# Patient Record
Sex: MALE | Race: WHITE | NOT HISPANIC OR LATINO | URBAN - METROPOLITAN AREA
[De-identification: names, ages, dates, MRNs, and addresses within clinical notes are randomized per-mention and may not be internally consistent; named-entity substitution may affect disease eponyms.]

---

## 2018-12-11 ENCOUNTER — INPATIENT (INPATIENT)
Facility: HOSPITAL | Age: 83
LOS: 2 days | Discharge: HOME | End: 2018-12-14
Attending: FAMILY MEDICINE | Admitting: FAMILY MEDICINE
Payer: MEDICARE

## 2018-12-11 VITALS — WEIGHT: 145.06 LBS | HEIGHT: 63 IN

## 2018-12-11 LAB
ALBUMIN SERPL ELPH-MCNC: 3.5 G/DL — SIGNIFICANT CHANGE UP (ref 3.3–5)
ALP SERPL-CCNC: 89 U/L — SIGNIFICANT CHANGE UP (ref 40–120)
ALT FLD-CCNC: 29 U/L — SIGNIFICANT CHANGE UP (ref 12–78)
ANION GAP SERPL CALC-SCNC: 8 MMOL/L — SIGNIFICANT CHANGE UP (ref 5–17)
APPEARANCE UR: CLEAR — SIGNIFICANT CHANGE UP
AST SERPL-CCNC: 32 U/L — SIGNIFICANT CHANGE UP (ref 15–37)
BASOPHILS # BLD AUTO: 0.05 K/UL — SIGNIFICANT CHANGE UP (ref 0–0.2)
BASOPHILS NFR BLD AUTO: 0.5 % — SIGNIFICANT CHANGE UP (ref 0–2)
BILIRUB SERPL-MCNC: 0.6 MG/DL — SIGNIFICANT CHANGE UP (ref 0.2–1.2)
BILIRUB UR-MCNC: NEGATIVE — SIGNIFICANT CHANGE UP
BUN SERPL-MCNC: 28 MG/DL — HIGH (ref 7–23)
CALCIUM SERPL-MCNC: 9.1 MG/DL — SIGNIFICANT CHANGE UP (ref 8.5–10.1)
CHLORIDE SERPL-SCNC: 112 MMOL/L — HIGH (ref 96–108)
CO2 SERPL-SCNC: 24 MMOL/L — SIGNIFICANT CHANGE UP (ref 22–31)
COLOR SPEC: YELLOW — SIGNIFICANT CHANGE UP
CREAT SERPL-MCNC: 1.34 MG/DL — HIGH (ref 0.5–1.3)
DIFF PNL FLD: NEGATIVE — SIGNIFICANT CHANGE UP
EOSINOPHIL # BLD AUTO: 0.15 K/UL — SIGNIFICANT CHANGE UP (ref 0–0.5)
EOSINOPHIL NFR BLD AUTO: 1.4 % — SIGNIFICANT CHANGE UP (ref 0–6)
GLUCOSE SERPL-MCNC: 92 MG/DL — SIGNIFICANT CHANGE UP (ref 70–99)
GLUCOSE UR QL: NEGATIVE MG/DL — SIGNIFICANT CHANGE UP
HCT VFR BLD CALC: 44.9 % — SIGNIFICANT CHANGE UP (ref 39–50)
HGB BLD-MCNC: 14.3 G/DL — SIGNIFICANT CHANGE UP (ref 13–17)
IMM GRANULOCYTES NFR BLD AUTO: 0.6 % — SIGNIFICANT CHANGE UP (ref 0–1.5)
INR BLD: 1.1 RATIO — SIGNIFICANT CHANGE UP (ref 0.88–1.16)
KETONES UR-MCNC: NEGATIVE — SIGNIFICANT CHANGE UP
LEUKOCYTE ESTERASE UR-ACNC: NEGATIVE — SIGNIFICANT CHANGE UP
LYMPHOCYTES # BLD AUTO: 1.29 K/UL — SIGNIFICANT CHANGE UP (ref 1–3.3)
LYMPHOCYTES # BLD AUTO: 12.4 % — LOW (ref 13–44)
MCHC RBC-ENTMCNC: 30.3 PG — SIGNIFICANT CHANGE UP (ref 27–34)
MCHC RBC-ENTMCNC: 31.8 GM/DL — LOW (ref 32–36)
MCV RBC AUTO: 95.1 FL — SIGNIFICANT CHANGE UP (ref 80–100)
MONOCYTES # BLD AUTO: 0.96 K/UL — HIGH (ref 0–0.9)
MONOCYTES NFR BLD AUTO: 9.3 % — SIGNIFICANT CHANGE UP (ref 2–14)
NEUTROPHILS # BLD AUTO: 7.86 K/UL — HIGH (ref 1.8–7.4)
NEUTROPHILS NFR BLD AUTO: 75.8 % — SIGNIFICANT CHANGE UP (ref 43–77)
NITRITE UR-MCNC: NEGATIVE — SIGNIFICANT CHANGE UP
NRBC # BLD: 0 /100 WBCS — SIGNIFICANT CHANGE UP (ref 0–0)
NT-PROBNP SERPL-SCNC: 3539 PG/ML — HIGH (ref 0–450)
PH UR: 5 — SIGNIFICANT CHANGE UP (ref 5–8)
PLATELET # BLD AUTO: 289 K/UL — SIGNIFICANT CHANGE UP (ref 150–400)
POTASSIUM SERPL-MCNC: 4.8 MMOL/L — SIGNIFICANT CHANGE UP (ref 3.5–5.3)
POTASSIUM SERPL-SCNC: 4.8 MMOL/L — SIGNIFICANT CHANGE UP (ref 3.5–5.3)
PROT SERPL-MCNC: 7.3 GM/DL — SIGNIFICANT CHANGE UP (ref 6–8.3)
PROT UR-MCNC: 15 MG/DL
PROTHROM AB SERPL-ACNC: 12.3 SEC — SIGNIFICANT CHANGE UP (ref 10–12.9)
RBC # BLD: 4.72 M/UL — SIGNIFICANT CHANGE UP (ref 4.2–5.8)
RBC # FLD: 14.3 % — SIGNIFICANT CHANGE UP (ref 10.3–14.5)
RBC CASTS # UR COMP ASSIST: NEGATIVE /HPF — SIGNIFICANT CHANGE UP (ref 0–4)
SODIUM SERPL-SCNC: 144 MMOL/L — SIGNIFICANT CHANGE UP (ref 135–145)
SP GR SPEC: 1.01 — SIGNIFICANT CHANGE UP (ref 1.01–1.02)
TROPONIN I SERPL-MCNC: 0.09 NG/ML — HIGH (ref 0.01–0.04)
TROPONIN I SERPL-MCNC: 0.1 NG/ML — HIGH (ref 0.01–0.04)
UROBILINOGEN FLD QL: NEGATIVE MG/DL — SIGNIFICANT CHANGE UP
WBC # BLD: 10.37 K/UL — SIGNIFICANT CHANGE UP (ref 3.8–10.5)
WBC # FLD AUTO: 10.37 K/UL — SIGNIFICANT CHANGE UP (ref 3.8–10.5)
WBC UR QL: SIGNIFICANT CHANGE UP

## 2018-12-11 PROCEDURE — 99285 EMERGENCY DEPT VISIT HI MDM: CPT

## 2018-12-11 PROCEDURE — 71250 CT THORAX DX C-: CPT | Mod: 26

## 2018-12-11 PROCEDURE — 93010 ELECTROCARDIOGRAM REPORT: CPT

## 2018-12-11 RX ORDER — PANTOPRAZOLE SODIUM 20 MG/1
40 TABLET, DELAYED RELEASE ORAL
Qty: 0 | Refills: 0 | Status: DISCONTINUED | OUTPATIENT
Start: 2018-12-11 | End: 2018-12-14

## 2018-12-11 RX ORDER — AZITHROMYCIN 500 MG/1
500 TABLET, FILM COATED ORAL ONCE
Qty: 0 | Refills: 0 | Status: COMPLETED | OUTPATIENT
Start: 2018-12-11 | End: 2018-12-11

## 2018-12-11 RX ORDER — ATORVASTATIN CALCIUM 80 MG/1
10 TABLET, FILM COATED ORAL AT BEDTIME
Qty: 0 | Refills: 0 | Status: DISCONTINUED | OUTPATIENT
Start: 2018-12-11 | End: 2018-12-14

## 2018-12-11 RX ORDER — ENOXAPARIN SODIUM 100 MG/ML
30 INJECTION SUBCUTANEOUS EVERY 24 HOURS
Qty: 0 | Refills: 0 | Status: DISCONTINUED | OUTPATIENT
Start: 2018-12-11 | End: 2018-12-12

## 2018-12-11 RX ORDER — FUROSEMIDE 40 MG
40 TABLET ORAL DAILY
Qty: 0 | Refills: 0 | Status: DISCONTINUED | OUTPATIENT
Start: 2018-12-11 | End: 2018-12-13

## 2018-12-11 RX ORDER — ASPIRIN/CALCIUM CARB/MAGNESIUM 324 MG
325 TABLET ORAL ONCE
Qty: 0 | Refills: 0 | Status: COMPLETED | OUTPATIENT
Start: 2018-12-11 | End: 2018-12-11

## 2018-12-11 RX ORDER — CEFTRIAXONE 500 MG/1
1000 INJECTION, POWDER, FOR SOLUTION INTRAMUSCULAR; INTRAVENOUS EVERY 24 HOURS
Qty: 0 | Refills: 0 | Status: DISCONTINUED | OUTPATIENT
Start: 2018-12-11 | End: 2018-12-14

## 2018-12-11 RX ORDER — ASPIRIN/CALCIUM CARB/MAGNESIUM 324 MG
81 TABLET ORAL DAILY
Qty: 0 | Refills: 0 | Status: DISCONTINUED | OUTPATIENT
Start: 2018-12-11 | End: 2018-12-14

## 2018-12-11 RX ORDER — AZITHROMYCIN 500 MG/1
500 TABLET, FILM COATED ORAL EVERY 24 HOURS
Qty: 0 | Refills: 0 | Status: DISCONTINUED | OUTPATIENT
Start: 2018-12-12 | End: 2018-12-14

## 2018-12-11 RX ORDER — CEFTRIAXONE 500 MG/1
1000 INJECTION, POWDER, FOR SOLUTION INTRAMUSCULAR; INTRAVENOUS ONCE
Qty: 0 | Refills: 0 | Status: COMPLETED | OUTPATIENT
Start: 2018-12-11 | End: 2018-12-11

## 2018-12-11 RX ORDER — MONTELUKAST 4 MG/1
10 TABLET, CHEWABLE ORAL DAILY
Qty: 0 | Refills: 0 | Status: DISCONTINUED | OUTPATIENT
Start: 2018-12-11 | End: 2018-12-14

## 2018-12-11 RX ORDER — FUROSEMIDE 40 MG
40 TABLET ORAL ONCE
Qty: 0 | Refills: 0 | Status: COMPLETED | OUTPATIENT
Start: 2018-12-11 | End: 2018-12-11

## 2018-12-11 RX ADMIN — Medication 325 MILLIGRAM(S): at 13:59

## 2018-12-11 RX ADMIN — CEFTRIAXONE 1000 MILLIGRAM(S): 500 INJECTION, POWDER, FOR SOLUTION INTRAMUSCULAR; INTRAVENOUS at 14:39

## 2018-12-11 RX ADMIN — MONTELUKAST 10 MILLIGRAM(S): 4 TABLET, CHEWABLE ORAL at 22:15

## 2018-12-11 RX ADMIN — ATORVASTATIN CALCIUM 10 MILLIGRAM(S): 80 TABLET, FILM COATED ORAL at 22:15

## 2018-12-11 RX ADMIN — AZITHROMYCIN 500 MILLIGRAM(S): 500 TABLET, FILM COATED ORAL at 16:46

## 2018-12-11 RX ADMIN — Medication 40 MILLIGRAM(S): at 13:59

## 2018-12-11 RX ADMIN — AZITHROMYCIN 255 MILLIGRAM(S): 500 TABLET, FILM COATED ORAL at 14:39

## 2018-12-11 RX ADMIN — ENOXAPARIN SODIUM 30 MILLIGRAM(S): 100 INJECTION SUBCUTANEOUS at 22:31

## 2018-12-11 NOTE — ED PROVIDER NOTE - OBJECTIVE STATEMENT
89 y/o male with a PMHx of [place] and chronic right hip pain on pain medication presents to the ED c/o cough x2 months. As per wife at bedside pt saw Dr. Posadas 3 weeks ago for an itchy throat, cough and sneezing, was given allergy medication. Recently, cough became productive with green phlegm and some wheezing. Pt went to  in [place] yesterday and CXR showed fluid in his left lung. Pt is now in the ED for more testing. Nonsmoker. No EtOH consumption. 89 y/o male with a PMHx of SVT s/p ablation at [facility], [place] and chronic right hip pain on pain medication presents to the ED c/o cough x2 months. As per wife at bedside pt saw Dr. Posadas 3 weeks ago for an itchy throat, cough and sneezing, was given allergy medication. Recently, cough became productive with green phlegm and some wheezing. Pt went to  in [place] yesterday and CXR showed fluid in his left lung. Pt is now in the ED for more testing. Nonsmoker. No EtOH consumption.

## 2018-12-11 NOTE — ED ADULT TRIAGE NOTE - CHIEF COMPLAINT QUOTE
Patient presents with daughter who reports patient has been wheezing. [facility] Patient denies shortness of breath

## 2018-12-11 NOTE — PHARMACOTHERAPY INTERVENTION NOTE - COMMENTS
med history complete, reviewed medications with patient and family. Patient currently has no questions about their medications.

## 2018-12-11 NOTE — ED ADULT NURSE NOTE - NSIMPLEMENTINTERV_GEN_ALL_ED
Implemented All Fall with Harm Risk Interventions:  [place] to call system. Call bell, personal items and telephone within reach. Instruct patient to call for assistance. Room bathroom lighting operational. Non-slip footwear when patient is off stretcher. Physically safe environment: no spills, clutter or unnecessary equipment. Stretcher in lowest position, wheels locked, appropriate side rails in place. Provide visual cue, wrist band, yellow gown, etc. Monitor gait and stability. Monitor for mental status changes and reorient to person, place, and time. Review medications for side effects contributing to fall risk. Reinforce activity limits and safety measures with patient and family. Provide visual clues: red socks.

## 2018-12-11 NOTE — H&P ADULT - ASSESSMENT
89 y/o male with a PMHx of SVT s/p ablation 5 yrs ago at [place], hx right hip replacement  and revision, and chronic right hip pain on pain medication, hard of hearing presented to the ED c/o cough x2 months. Pt saw Dr. Posadas 3 weeks ago for an itchy throat, cough and sneezing, was given allergy medication. Recently, cough became productive with green phlegm and some wheezing associated  with B/l lower extremity edema. Pt went to  in [place] today and CXR showed fluid in his left lung and was sent to the ED for further evaluation.  ROS- patient  denies any chest pain or SOB ,denies abdominal pain or fever or chills, no dysuria   In ED patient afebrile , found to have  B/L pleural effusions L>R , cannot exclude LLL and lingular PNA  On labs No leukocytosis,  BALDO BUN 28/cr 1.34, BNP 3500, troponin elevated upto 0.1  EKG sinus rhthym with PVC 's old septal infarct unchanged from EKG done Oct 2018    A/P  #Acute Heart failure / EF unknown /no Prior hx of CHF  - Admit to tele  - continue IV lasix   -Monitor I/O, Daily weights  -Echo  -serial cardiac enzymes  -Lipid and TSH in am  -Cardiology consult    #Suspect Left lower lobe and lingular community acquired Pneumonia/ suspect GNR and atypical   - IV ceftriaxone and IV zithromax  -Blood cx  -urine legionella and mycoplasma antibodies    #BALDO likely prerenal azotemia from acute heart failure  /No  prior labs to compare with  - continue IV lasix for now  and monitor BMP    #Elevated troponins  with no significant new acute EKG changes ,no chest pain - Likely demand ischemia  -monitor cardiac enzymes and continue tele monitoring     #DVT prophylaxis - Lovenox SC  IMPROVE VTE Individual Risk Assessment    RISK                                                                Points    [  ] Previous VTE                                                  3    [  ] Thrombophilia                                               2    [  ] Lower limb paralysis                                      2        (unable to hold up >15 seconds)      [  ] Current Cancer                                              2         (within 6 months)    [  ] Immobilization > 24 hrs                                1    [  ] ICU/CCU stay > 24 hours                              1    [  ] Age > 60                                                      1    IMPROVE VTE Score _____2____ 89 y/o male with a PMHx of SVT s/p ablation 5 yrs ago at [place], hx right hip replacement  and revision, and chronic right hip pain on pain medication, hard of hearing presented to the ED c/o cough x2 months. Pt saw Dr. Posadas 3 weeks ago for an itchy throat, cough and sneezing, was given allergy medication. Recently, cough became productive with green phlegm and some wheezing associated  with B/l lower extremity edema. Pt went to  in [place] today and CXR showed fluid in his left lung and was sent to the ED for further evaluation.  ROS- patient  denies any chest pain or SOB ,denies abdominal pain or fever or chills, no dysuria   In ED patient afebrile , found to have  B/L pleural effusions L>R , cannot exclude LLL and lingular PNA  On labs No leukocytosis,  BALDO BUN 28/cr 1.34, BNP 3500, troponin elevated upto 0.1  EKG sinus rhthym with PVC 's old septal infarct unchanged from EKG done Oct 2018    A/P  #Acute Heart failure / EF unknown /no Prior hx of CHF  - Admit to tele  - continue IV lasix   continue asa, statin  -will hold off ACEI for now due to BALDO  -Monitor I/O, Daily weights  -Echo  -serial cardiac enzymes  -Lipid and TSH in am  -Cardiology consult    #Suspect Left lower lobe and lingular community acquired Pneumonia/ suspect GNR and atypical   - IV ceftriaxone and IV zithromax  -Blood cx  -urine legionella and mycoplasma antibodies    #BALDO likely prerenal azotemia from acute heart failure  /No  prior labs to compare with  - continue IV lasix for now  and monitor BMP    #Elevated troponins  with no significant new acute EKG changes ,no chest pain - Likely demand ischemia  -monitor cardiac enzymes and continue tele monitoring     #DVT prophylaxis - Lovenox SC  IMPROVE VTE Individual Risk Assessment    RISK                                                                Points    [  ] Previous VTE                                                  3    [  ] Thrombophilia                                               2    [  ] Lower limb paralysis                                      2        (unable to hold up >15 seconds)      [  ] Current Cancer                                              2         (within 6 months)    [  ] Immobilization > 24 hrs                                1    [  ] ICU/CCU stay > 24 hours                              1    [  ] Age > 60                                                      1    IMPROVE VTE Score _____2____

## 2018-12-11 NOTE — ED ADULT NURSE NOTE - CHIEF COMPLAINT QUOTE
Patient presents with daughter who reports patient has been wheezing. [facility] Patient denies shortness of breath

## 2018-12-11 NOTE — ED PROVIDER NOTE - CARE PLAN
Principal Discharge DX:	CHF (congestive heart failure)  Secondary Diagnosis:	Opacity of lung on imaging study

## 2018-12-11 NOTE — H&P ADULT - NSHPLABSRESULTS_GEN_ALL_CORE
14.3   10.37 )-----------( 289      ( 11 Dec 2018 11:08 )             44.9       CBC Full  -  ( 11 Dec 2018 11:08 )  WBC Count : 10.37 K/uL  Hemoglobin : 14.3 g/dL  Hematocrit : 44.9 %  Platelet Count - Automated : 289 K/uL  Mean Cell Volume : 95.1 fl  Mean Cell Hemoglobin : 30.3 pg  Mean Cell Hemoglobin Concentration : 31.8 gm/dL  Auto Neutrophil # : 7.86 K/uL  Auto Lymphocyte # : 1.29 K/uL  Auto Monocyte # : 0.96 K/uL  Auto Eosinophil # : 0.15 K/uL  Auto Basophil # : 0.05 K/uL  Auto Neutrophil % : 75.8 %  Auto Lymphocyte % : 12.4 %  Auto Monocyte % : 9.3 %  Auto Eosinophil % : 1.4 %  Auto Basophil % : 0.5 %          144  |  112<H>  |  28<H>  ----------------------------<  92  4.8   |  24  |  1.34<H>    Ca    9.1      11 Dec 2018 11:08    TPro  7.3  /  Alb  3.5  /  TBili  0.6  /  DBili  x   /  AST  32  /  ALT  29  /  AlkPhos  89  12-11      LIVER FUNCTIONS - ( 11 Dec 2018 11:08 )  Alb: 3.5 g/dL / Pro: 7.3 gm/dL / ALK PHOS: 89 U/L / ALT: 29 U/L / AST: 32 U/L / GGT: x             PT/INR - ( 11 Dec 2018 11:08 )   PT: 12.3 sec;   INR: 1.10 ratio             CARDIAC MARKERS ( 11 Dec 2018 14:09 )  0.100 ng/mL / x     / x     / x     / x      CARDIAC MARKERS ( 11 Dec 2018 11:08 )  0.094 ng/mL / x     / x     / x     / x            Urinalysis Basic - ( 11 Dec 2018 14:46 )    Color: Yellow / Appearance: Clear / S.015 / pH: x  Gluc: x / Ketone: Negative  / Bili: Negative / Urobili: Negative mg/dL   Blood: x / Protein: 15 mg/dL / Nitrite: Negative   Leuk Esterase: Negative / RBC: Negative /HPF / WBC 0-2   Sq Epi: x / Non Sq Epi: x / Bacteria: x            MEDICATIONS  (STANDING):  aspirin enteric coated 81 milliGRAM(s) Oral daily  atorvastatin 10 milliGRAM(s) Oral at bedtime  cefTRIAXone Injectable. 1000 milliGRAM(s) IV Push every 24 hours  enoxaparin Injectable 30 milliGRAM(s) SubCutaneous every 24 hours  furosemide   Injectable 40 milliGRAM(s) IV Push daily  montelukast 10 milliGRAM(s) Oral daily  pantoprazole    Tablet 40 milliGRAM(s) Oral before breakfast

## 2018-12-11 NOTE — H&P ADULT - NSHPPHYSICALEXAM_GEN_ALL_CORE
PHYSICAL EXAM:    Daily Height in cm: 160.02 (11 Dec 2018 10:52)    Daily     ICU Vital Signs Last 24 Hrs  T(C): 36.7 (11 Dec 2018 12:02), Max: 36.7 (11 Dec 2018 12:02)  T(F): 98 (11 Dec 2018 12:02), Max: 98 (11 Dec 2018 12:02)  HR: 86 (11 Dec 2018 14:26) (82 - 101)  BP: 135/84 (11 Dec 2018 14:26) (127/92 - 155/97)  BP(mean): --  ABP: --  ABP(mean): --  RR: 18 (11 Dec 2018 14:26) (18 - 18)  SpO2: 100% (11 Dec 2018 14:26) (97% - 100%)      Constitutional: NAD  HEENT: Atraumatic, [place]IR[place], Normal, No congestion  Respiratory: decreased breath sound bilateral lung bases  Cardiovascular: N S1S2;  Gastrointestinal: Abdomen soft, non tender, Bowel Ssounds present  Extremities: 2+ B/l Lower extremity pitting edema  Neurological: AAOriented to place, person, follows commands ,no gross focal motor deficits  Back: No CVA tenderness   Musculoskeletal: non tender  Breasts: Deferred  Genitourinary: deferred  Rectal: Deferred

## 2018-12-11 NOTE — ED ADULT NURSE REASSESSMENT NOTE - NS ED NURSE REASSESS COMMENT FT1
Pt and family updated on POC and verbalize awareness. Pt denies distress at this time. Pt awaiting bed availability. Tolerated lunch well. Will cont to monitor.

## 2018-12-11 NOTE — ED PROVIDER NOTE - MUSCULOSKELETAL, MLM
Spine appears normal, range of motion is not limited, no muscle or joint tenderness. 2+ pitting edema bilateral lower extremities.

## 2018-12-11 NOTE — ED PROVIDER NOTE - MEDICAL DECISION MAKING DETAILS
Pt with signs of CHF exacerbation based on labs and imaging.  Mild troponin elevation likely 2/2 demand ischemia from CHF exacerbation.  Given ASA, and Lasix IV.  Also LLL and lingular PNA unable to be excluded on imaging, and pt received IV antibiotics for CAP coverage.  Discussed admission to medicine service with Dr. Martins. Pt with signs of CHF exacerbation based on labs and imaging.  Mild troponin elevation likely 2/2 demand ischemia from CHF exacerbation.  Given ASA, and Lasix IV.  Also LLL and lingular PNA unable to be excluded on imaging, and pt received IV antibiotics for CAP coverage.  IV fluids held given pt with CHF exacerbation, and currently doesn't meet sepsis criteria. Discussed admission to medicine service with Dr. Martins.

## 2018-12-11 NOTE — ED ADULT NURSE NOTE - OBJECTIVE STATEMENT
Pt and family reports cough being followed outpt that has lasted for months. Pt reports decreased activity intolerance and wheezing. Pt denies SOB at rest. Denies pain at this time.

## 2018-12-11 NOTE — H&P ADULT - HISTORY OF PRESENT ILLNESS
89 y/o male with a PMHx of SVT s/p ablation 5 yrs ago at [place], hx right hip replacement  and revision, and chronic right hip pain on pain medication presented to the ED c/o cough x2 months. Pt saw Dr. Posadas 3 weeks ago for an itchy throat, cough and sneezing, was given allergy medication. Recently, cough became productive with green phlegm and some wheezing associated  with B/l lower extremity edema. Pt went to  in [place] today and CXR showed fluid in his left lung and was sent to the ED for further evaluation.  ROS- patient  denies any chest pain or SOB ,denies abdominal pain or fever or chills, no dysuria   In ED patient afebrile , found to have  B/L pleural effusions L>R , cannot exclude LLL and lingular PNA  On labs 89 y/o male with a PMHx of SVT s/p ablation 5 yrs ago at [place], hx right hip replacement  and revision, and chronic right hip pain on pain medication, hard of hearing presented to the ED c/o cough x2 months. Pt saw Dr. Posadas 3 weeks ago for an itchy throat, cough and sneezing, was given allergy medication. Recently, cough became productive with green phlegm and some wheezing associated  with B/l lower extremity edema. Pt went to  in [place] today and CXR showed fluid in his left lung and was sent to the ED for further evaluation.  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

## 2018-12-12 DIAGNOSIS — R74.8 ABNORMAL LEVELS OF OTHER SERUM ENZYMES: ICD-10-CM

## 2018-12-12 DIAGNOSIS — J90 PLEURAL EFFUSION, NOT ELSEWHERE CLASSIFIED: ICD-10-CM

## 2018-12-12 DIAGNOSIS — J18.9 PNEUMONIA, UNSPECIFIED ORGANISM: ICD-10-CM

## 2018-12-12 DIAGNOSIS — I50.9 HEART FAILURE, UNSPECIFIED: ICD-10-CM

## 2018-12-12 LAB
ANION GAP SERPL CALC-SCNC: 8 MMOL/L — SIGNIFICANT CHANGE UP (ref 5–17)
BASOPHILS # BLD AUTO: 0.04 K/UL — SIGNIFICANT CHANGE UP (ref 0–0.2)
BASOPHILS NFR BLD AUTO: 0.4 % — SIGNIFICANT CHANGE UP (ref 0–2)
BUN SERPL-MCNC: 29 MG/DL — HIGH (ref 7–23)
CALCIUM SERPL-MCNC: 8.9 MG/DL — SIGNIFICANT CHANGE UP (ref 8.5–10.1)
CHLORIDE SERPL-SCNC: 107 MMOL/L — SIGNIFICANT CHANGE UP (ref 96–108)
CHOLEST SERPL-MCNC: 129 MG/DL — SIGNIFICANT CHANGE UP (ref 10–199)
CO2 SERPL-SCNC: 26 MMOL/L — SIGNIFICANT CHANGE UP (ref 22–31)
CREAT SERPL-MCNC: 1.37 MG/DL — HIGH (ref 0.5–1.3)
EOSINOPHIL # BLD AUTO: 0.19 K/UL — SIGNIFICANT CHANGE UP (ref 0–0.5)
EOSINOPHIL NFR BLD AUTO: 2 % — SIGNIFICANT CHANGE UP (ref 0–6)
GLUCOSE SERPL-MCNC: 88 MG/DL — SIGNIFICANT CHANGE UP (ref 70–99)
HCT VFR BLD CALC: 42.6 % — SIGNIFICANT CHANGE UP (ref 39–50)
HDLC SERPL-MCNC: 44 MG/DL — SIGNIFICANT CHANGE UP
HGB BLD-MCNC: 13.9 G/DL — SIGNIFICANT CHANGE UP (ref 13–17)
IMM GRANULOCYTES NFR BLD AUTO: 0.4 % — SIGNIFICANT CHANGE UP (ref 0–1.5)
LIPID PNL WITH DIRECT LDL SERPL: 75 MG/DL — SIGNIFICANT CHANGE UP
LYMPHOCYTES # BLD AUTO: 1.04 K/UL — SIGNIFICANT CHANGE UP (ref 1–3.3)
LYMPHOCYTES # BLD AUTO: 10.8 % — LOW (ref 13–44)
MCHC RBC-ENTMCNC: 31 PG — SIGNIFICANT CHANGE UP (ref 27–34)
MCHC RBC-ENTMCNC: 32.6 GM/DL — SIGNIFICANT CHANGE UP (ref 32–36)
MCV RBC AUTO: 94.9 FL — SIGNIFICANT CHANGE UP (ref 80–100)
MONOCYTES # BLD AUTO: 0.74 K/UL — SIGNIFICANT CHANGE UP (ref 0–0.9)
MONOCYTES NFR BLD AUTO: 7.7 % — SIGNIFICANT CHANGE UP (ref 2–14)
NEUTROPHILS # BLD AUTO: 7.58 K/UL — HIGH (ref 1.8–7.4)
NEUTROPHILS NFR BLD AUTO: 78.7 % — HIGH (ref 43–77)
NRBC # BLD: 0 /100 WBCS — SIGNIFICANT CHANGE UP (ref 0–0)
PLATELET # BLD AUTO: 268 K/UL — SIGNIFICANT CHANGE UP (ref 150–400)
POTASSIUM SERPL-MCNC: 4.9 MMOL/L — SIGNIFICANT CHANGE UP (ref 3.5–5.3)
POTASSIUM SERPL-SCNC: 4.9 MMOL/L — SIGNIFICANT CHANGE UP (ref 3.5–5.3)
RBC # BLD: 4.49 M/UL — SIGNIFICANT CHANGE UP (ref 4.2–5.8)
RBC # FLD: 14.3 % — SIGNIFICANT CHANGE UP (ref 10.3–14.5)
SODIUM SERPL-SCNC: 141 MMOL/L — SIGNIFICANT CHANGE UP (ref 135–145)
TOTAL CHOLESTEROL/HDL RATIO MEASUREMENT: 2.9 RATIO — LOW (ref 3.4–9.6)
TRIGL SERPL-MCNC: 52 MG/DL — SIGNIFICANT CHANGE UP (ref 10–149)
TSH SERPL-MCNC: 2 UU/ML — SIGNIFICANT CHANGE UP (ref 0.34–4.82)
WBC # BLD: 9.63 K/UL — SIGNIFICANT CHANGE UP (ref 3.8–10.5)
WBC # FLD AUTO: 9.63 K/UL — SIGNIFICANT CHANGE UP (ref 3.8–10.5)

## 2018-12-12 PROCEDURE — 93010 ELECTROCARDIOGRAM REPORT: CPT

## 2018-12-12 PROCEDURE — 93306 TTE W/DOPPLER COMPLETE: CPT | Mod: 26

## 2018-12-12 PROCEDURE — 99223 1ST HOSP IP/OBS HIGH 75: CPT

## 2018-12-12 RX ORDER — HEPARIN SODIUM 5000 [USP'U]/ML
5000 INJECTION INTRAVENOUS; SUBCUTANEOUS EVERY 12 HOURS
Qty: 0 | Refills: 0 | Status: DISCONTINUED | OUTPATIENT
Start: 2018-12-12 | End: 2018-12-14

## 2018-12-12 RX ADMIN — Medication 81 MILLIGRAM(S): at 11:38

## 2018-12-12 RX ADMIN — AZITHROMYCIN 255 MILLIGRAM(S): 500 TABLET, FILM COATED ORAL at 14:11

## 2018-12-12 RX ADMIN — Medication 600 MILLIGRAM(S): at 18:04

## 2018-12-12 RX ADMIN — Medication 40 MILLIGRAM(S): at 11:38

## 2018-12-12 RX ADMIN — PANTOPRAZOLE SODIUM 40 MILLIGRAM(S): 20 TABLET, DELAYED RELEASE ORAL at 06:39

## 2018-12-12 RX ADMIN — MONTELUKAST 10 MILLIGRAM(S): 4 TABLET, CHEWABLE ORAL at 11:38

## 2018-12-12 RX ADMIN — CEFTRIAXONE 1000 MILLIGRAM(S): 500 INJECTION, POWDER, FOR SOLUTION INTRAMUSCULAR; INTRAVENOUS at 14:11

## 2018-12-12 RX ADMIN — HEPARIN SODIUM 5000 UNIT(S): 5000 INJECTION INTRAVENOUS; SUBCUTANEOUS at 22:12

## 2018-12-12 RX ADMIN — ATORVASTATIN CALCIUM 10 MILLIGRAM(S): 80 TABLET, FILM COATED ORAL at 22:11

## 2018-12-12 NOTE — CONSULT NOTE ADULT - ASSESSMENT
SOB and cough - acute on chronic decompensated HF- unknown LVEF- hypervolemic, NYHA class III-   Continue diuresis with iv lasix.  Diuresis with close monitoring of the renal function and electrolytes.  Goal potassium of 4 and magnesium of 2.   Strict I/O and daily wt checks. Low sodium diet. Nutrition education.   check 2 D echo.  Pt does not know if he had ischemic workup in past.  Had SVT ablation in past per family.  Monitor for arrythmias.  Check TFTs.    Hyperlipidemia- c ontinue statin.    Discussed clinical mange ment plan with daughter at length.     Other medical issues- Management per primary team.   Thank you for allowing me to participate in the care of this patient. Please feel free to contact me with any questions.

## 2018-12-12 NOTE — CONSULT NOTE ADULT - SUBJECTIVE AND OBJECTIVE BOX
THIS IS THE NOTE FOR 2018, PT SEEN IN THE ER AT 5.28PM, IN ROOM I.    Patient is a 89y old  Male who presents with a chief complaint of cough, wheezing.   HPI:  89 y/o male with a PMHx of SVT s/p ablation 5 yrs ago at [place], hx right hip replacement  and revision, and chronic right hip pain on pain medication, hard of hearing presented to the ED c/o cough x2 months. Pt saw Dr. Posadas 3 weeks ago for an itchy throat, cough and sneezing, was given allergy medication. Recently, cough became productive with green phlegm and some wheezing associated  with B/l lower extremity edema. Pt went to  in [place] today and CXR showed fluid in his left lung and was sent to the ED for further evaluation.    In ED patient afebrile , found to have  B/L pleural effusions L>R , cannot exclude LLL and lingular PNA  On labs No leukocytosis,  BALDO BUN 28/cr 1.34, BNP 3500, troponin elevated upto 0.1    Cardiology team consulted for evaluation for CHF.    Pt states that he had been diuresing since presentation since diuretic was given to him.  He denies any CP or pressure or dizziness.  His daughter at bedside and she agreed with the history provided.         Pshx- Left ankle surgery, R hip replacemnt and revision , tonsillectomy, radiofrequency cardio ablation for SVT 5 yrs ago at [facility]  social hx- denies etoh use, never smoker, denies recreational drug use  family hx- hx lung cancer brother, hx of colon cancer 2nd brother         MEDICATIONS  (STANDING):  aspirin enteric coated 81 milliGRAM(s) Oral daily  atorvastatin 10 milliGRAM(s) Oral at bedtime  azithromycin  IVPB 500 milliGRAM(s) IV Intermittent every 24 hours  cefTRIAXone Injectable. 1000 milliGRAM(s) IV Push every 24 hours  enoxaparin Injectable 30 milliGRAM(s) SubCutaneous every 24 hours  furosemide   Injectable 40 milliGRAM(s) IV Push daily  montelukast 10 milliGRAM(s) Oral daily  pantoprazole    Tablet 40 milliGRAM(s) Oral before breakfast        REVIEW OF SYSTEMS:  CONSTITUTIONAL:    No fatigue, malaise, lethargy.  No fever or chills.  HEENT:  Eyes:  No visual changes.     ENT:  No epistaxis.  No sinus pain.    RESPIRATORY:  c/o cough.  No wheeze.  No hemoptysis.  c/o shortness of breath.  CARDIOVASCULAR:  No chest pains.  No palpitations. No shortness of breath, No orthopnea or PND.  GASTROINTESTINAL:  No abdominal pain.  No nausea or vomiting.    GENITOURINARY:    No hematuria.    MUSCULOSKELETAL:  No musculoskeletal pain.  No joint swelling.  No arthritis.  NEUROLOGICAL:  No tingling or numbness or weakness.  PSYCHIATRIC:  No confusion  SKIN:  No rashes.    ENDOCRINE:  No unexplained weight loss.  No polydipsia.   HEMATOLOGIC:  No anemia.  No prolonged or excessive bleeding.   ALLERGIC AND IMMUNOLOGIC:  No pruritus.          Vital Signs Last 24 Hrs  T(C): 36.6 (12 Dec 2018 04:45), Max: 36.7 (11 Dec 2018 12:02)  T(F): 97.8 (12 Dec 2018 04:45), Max: 98 (11 Dec 2018 12:02)  HR: 89 (12 Dec 2018 04:45) (82 - 101)  BP: 136/82 (12 Dec 2018 04:45) (109/81 - 155/97)  BP(mean): --  RR: 18 (12 Dec 2018 04:45) (18 - 21)  SpO2: 95% (12 Dec 2018 04:45) (95% - 100%)    PHYSICAL EXAM-    Constitutional: The patient appears to be normal, well developed, well nourished and alert and oriented to time, place and person. The patient does not appear acutely ill.     Head: Head is normocephalic and atraumatic.      Neck: No jugular venous distention. No audible carotid bruits. There are strong carotid pulses bilaterally. No JVD.     Cardiovascular: Regular rate and rhythm without S3, S4. No murmurs or rubs are appreciated.      Respiratory: Breath sounds are decreased at bases.    Abdomen: Soft, nontender, nondistended with positive bowel sounds.      Extremity: No tenderness. No  pitting edema     Neurologic: The patient is alert and oriented.      Skin: No rash, no obvious lesions noted.      Psychiatric: The patient appears to be emotionally stable.      INTERPRETATION OF TELEMETRY: sinus rythm    ECG: Sinus rythm , poor R wave progression, PVCs.     I&O's Detail      LABS:                        13.9   9.63  )-----------( 268      ( 12 Dec 2018 05:51 )             42.6     12-11    144  |  112<H>  |  28<H>  ----------------------------<  92  4.8   |  24  |  1.34<H>    Ca    9.1      11 Dec 2018 11:08    TPro  7.3  /  Alb  3.5  /  TBili  0.6  /  DBili  x   /  AST  32  /  ALT  29  /  AlkPhos  89      CARDIAC MARKERS ( 11 Dec 2018 14:09 )  0.100 ng/mL / x     / x     / x     / x      CARDIAC MARKERS ( 11 Dec 2018 11:08 )  0.094 ng/mL / x     / x     / x     / x          PT/INR - ( 11 Dec 2018 11:08 )   PT: 12.3 sec;   INR: 1.10 ratio           Urinalysis Basic - ( 11 Dec 2018 14:46 )    Color: Yellow / Appearance: Clear / S.015 / pH: x  Gluc: x / Ketone: Negative  / Bili: Negative / Urobili: Negative mg/dL   Blood: x / Protein: 15 mg/dL / Nitrite: Negative   Leuk Esterase: Negative / RBC: Negative /HPF / WBC 0-2   Sq Epi: x / Non Sq Epi: x / Bacteria: x      I&O's Summary    BNPSerum Pro-Brain Natriuretic Peptide: 3539 pg/mL ( @ 11:08)    RADIOLOGY & ADDITIONAL STUDIES:  < from: CT Chest No Cont (18 @ 13:05) >    EXAM:  CT CHEST                            PROCEDURE DATE:  2018          INTERPRETATION:  Chest CT without contrast dated 2018.    COMPARISON: None available.    CLINICAL INFORMATION: Cough.    TECHNIQUE: Contiguous axial 2.5 mm slice thickness images of the chest   were obtained without intravenous contrast administration.    FINDINGS:    The airway shows normal caliber and contour with patent lumen.    There are bilateral pleural effusions, larger on the left. Near complete   atelectasis of the left lower lobe. The superior segment of the left   lower lobe is partially aerated. Partial atelectasis of the right lower   lobe and also atelectatic changes in the lingula. A 3 mm likely   atelectatic nodule in the superior right upper lobe, image #3/28 and a 3   mm nodule in the inferior right upper lobe on image #3/54. Interlobular   septal thickening is visualized in the upper and the aerated lower lobes,   likely indicative of mild pulmonary vascular congestion. Clinical   correlation is recommended. Underlying pneumonia in the lingula and the   left lower  lobe  can not be excluded. Clinical correlation is   recommended.    The mediastinum great vessels, ectasia and calcified plaques in the   thoracic aorta.  Cardiomegaly. There is no pericardial effusion  A limited evaluation of the upper abdomen, 3.3 cm cyst in the posterior   cortex of there right kidney. A 2.5 cm cyst in the posterior cortex of   the left kidney.    The bones , hypertrophic degenerative changes in the thoracic spine.    IMPRESSION:   Bilateral pleural effusions and atelectatic changes in the lower lobes   and the lingula as described. Cardiomegaly. Findings  are likely   indicative of congestive heart failure for which clinical correlation is   recommended.  Left lower lobe and lingular pneumonia cannot be excluded.                  NELIDA COKER M.D., ATTENDING RADIOLOGIST  This document has been electronically signed. Dec 11 2018  1:58PM                < end of copied text >

## 2018-12-12 NOTE — CONSULT NOTE ADULT - SUBJECTIVE AND OBJECTIVE BOX
HPI:  89 y/o male with a PMHx of SVT s/p ablation 5 yrs ago at [facility], hx right hip replacement  and revision, and chronic right hip pain on pain medication, hard of hearing presented to the ED c/o cough x2 months. Pt saw Dr. Posadas 3 weeks ago for an itchy throat, cough and sneezing, was given allergy medication. Recently, cough became productive with green phlegm and some wheezing associated  with B/l lower extremity edema. Pt went to  in [place] today and CXR showed fluid in his left lung and was sent to the ED for further evaluation.  ROS- patient  denies any chest pain or SOB ,denies abdominal pain or fever or chills, no dysuria   In ED patient afebrile , found to have  B/L pleural effusions L>R , cannot exclude LLL and lingular PNA  On labs No leukocytosis,  BALDO BUN 28/cr 1.34, BNP 3500, troponin elevated upto 0.1  EKG sinus rhthym with PVC 's old septal infarct unchanged from EKG done Oct 2018    Pt is nonsmoker, no h.o. asthma. notes some orthopnea. has LE edema.     :  cough mildly improving today.  has yellow sputum.     Pmhx- see hpi   Pshx- Left ankle surgery, R hip replacemnt and revision , tonsillectomy, radiofrequency cardio ablation for SVT 5 yrs ago at [facility]  social hx- denies etoh use, never smoker, denies recreational drug use  family hx- hx lung cancer brother, hx of colon cancer 2nd brother (11 Dec 2018 17:15)      PAST MEDICAL & SURGICAL HISTORY:  [place] (hard of hearing)      MEDICATIONS  (STANDING):  aspirin enteric coated 81 milliGRAM(s) Oral daily  atorvastatin 10 milliGRAM(s) Oral at bedtime  azithromycin  IVPB 500 milliGRAM(s) IV Intermittent every 24 hours  cefTRIAXone Injectable. 1000 milliGRAM(s) IV Push every 24 hours  enoxaparin Injectable 30 milliGRAM(s) SubCutaneous every 24 hours  furosemide   Injectable 40 milliGRAM(s) IV Push daily  montelukast 10 milliGRAM(s) Oral daily  pantoprazole    Tablet 40 milliGRAM(s) Oral before breakfast    MEDICATIONS  (PRN):      Allergies    No Known Allergies    Intolerances        SOCIAL HISTORY: Denies tobacco, etoh abuse or illicit drug use    FAMILY HISTORY:      Vital Signs Last 24 Hrs  T(C): 36.6 (12 Dec 2018 04:45), Max: 36.7 (11 Dec 2018 12:02)  T(F): 97.8 (12 Dec 2018 04:45), Max: 98 (11 Dec 2018 12:02)  HR: 89 (12 Dec 2018 04:45) (82 - 101)  BP: 136/82 (12 Dec 2018 04:45) (109/81 - 155/97)  BP(mean): --  RR: 18 (12 Dec 2018 04:45) (18 - 21)  SpO2: 95% (12 Dec 2018 04:45) (95% - 100%)    REVIEW OF SYSTEMS:    CONSTITUTIONAL:  As per HPI.  HEENT:  Eyes:  No diplopia or blurred vision. ENT:  No earache, sore throat or runny nose.  CARDIOVASCULAR:  No pressure, squeezing, tightness, heaviness or aching about the chest, neck, axilla or epigastrium.  RESPIRATORY:  see above.   GASTROINTESTINAL:  No nausea, vomiting or diarrhea.  GENITOURINARY:  No dysuria, frequency or urgency.  MUSCULOSKELETAL:  As per HPI.  SKIN:  No change in skin, hair or nails.  NEUROLOGIC:  No paresthesias, fasciculations, seizures or weakness.  PSYCHIATRIC:  No disorder of thought or mood.  ENDOCRINE:  No heat or cold intolerance, polyuria or polydipsia.  HEMATOLOGICAL:  No easy bruising or bleedings:  .     PHYSICAL EXAMINATION:    GENERAL APPEARANCE:  Pt. is not currently dyspneic, in no distress. Pt. is alert, oriented, and pleasant.  HEENT:  Pupils are normal and react normally. No icterus. Mucous membranes well colored.  NECK:  Supple. No lymphadenopathy. Jugular venous pressure not elevated. Carotids equal.   HEART:   The cardiac impulse has a normal quality. Regular. Normal S1 and S2. There are no murmurs, rubs or gallops noted  CHEST:  Decreased BS's in bases.  Scattered expir wheeze.   ABDOMEN:  Soft and nontender.   EXTREMITIES:  There is no cyanosis, clubbing.  1 plus LE edema.  SKIN:  No rash or significant lesions are noted.  Neuro: Alert, awake, and O x 3.      LABS:                        13.9   9.63  )-----------( 268      ( 12 Dec 2018 05:51 )             42.6         141  |  107  |  29<H>  ----------------------------<  88  4.9   |  26  |  1.37<H>    Ca    8.9      12 Dec 2018 05:51    TPro  7.3  /  Alb  3.5  /  TBili  0.6  /  DBili  x   /  AST  32  /  ALT  29  /  AlkPhos  89  12-11    LIVER FUNCTIONS - ( 11 Dec 2018 11:08 )  Alb: 3.5 g/dL / Pro: 7.3 gm/dL / ALK PHOS: 89 U/L / ALT: 29 U/L / AST: 32 U/L / GGT: x           PT/INR - ( 11 Dec 2018 11:08 )   PT: 12.3 sec;   INR: 1.10 ratio           CARDIAC MARKERS ( 11 Dec 2018 14:09 )  0.100 ng/mL / x     / x     / x     / x      CARDIAC MARKERS ( 11 Dec 2018 11:08 )  0.094 ng/mL / x     / x     / x     / x          Urinalysis Basic - ( 11 Dec 2018 14:46 )    Color: Yellow / Appearance: Clear / S.015 / pH: x  Gluc: x / Ketone: Negative  / Bili: Negative / Urobili: Negative mg/dL   Blood: x / Protein: 15 mg/dL / Nitrite: Negative   Leuk Esterase: Negative / RBC: Negative /HPF / WBC 0-2   Sq Epi: x / Non Sq Epi: x / Bacteria: x          RADIOLOGY & ADDITIONAL STUDIES:       INTERPRETATION:  Chest CT without contrast dated 2018.    COMPARISON: None available.    CLINICAL INFORMATION: Cough.    TECHNIQUE: Contiguous axial 2.5 mm slice thickness images of the chest   were obtained without intravenous contrast administration.    FINDINGS:    The airway shows normal caliber and contour with patent lumen.    There are bilateral pleural effusions, larger on the left. Near complete   atelectasis of the left lower lobe. The superior segment of the left   lower lobe is partially aerated. Partial atelectasis of the right lower   lobe and also atelectatic changes in the lingula. A 3 mm likely   atelectatic nodule in the superior right upper lobe, image #3/28 and a 3   mm nodule in the inferior right upper lobe on image #3/54. Interlobular   septal thickening is visualized in the upper and the aerated lower lobes,   likely indicative of mild pulmonary vascular congestion. Clinical   correlation is recommended. Underlying pneumonia in the lingula and the   left lower  lobe  can not be excluded. Clinical correlation is   recommended.    The mediastinum great vessels, ectasia and calcified plaques in the   thoracic aorta.  Cardiomegaly. There is no pericardial effusion  A limited evaluation of the upper abdomen, 3.3 cm cyst in the posterior   cortex of there right kidney. A 2.5 cm cyst in the posterior cortex of   the left kidney.    The bones , hypertrophic degenerative changes in the thoracic spine.    IMPRESSION:   Bilateral pleural effusions and atelectatic changes in the lower lobes   and the lingula as described. Cardiomegaly. Findings  are likely   indicative of congestive heart failure for which clinical correlation is   recommended.  Left lower lobe and lingular pneumonia cannot be excluded.

## 2018-12-12 NOTE — CONSULT NOTE ADULT - ASSESSMENT
Acute/Chr CHF.  On IV lasix.  NC O2.  Cardiology following.     Bilat L greater than R pleural effusions. Some loculation on L.  For CXR in 1-2 days.    Treat for CAP. IV Abx's.  NC O2.  Mucinex prn.  Continue Montelukast. Acute/Chr CHF.  On IV lasix.  NC O2.  Cardiology following.     Bilat L greater than R pleural effusions. Some loculation on L.  For CXR in 1-2 days.    Treat for CAP. IV Abx's.  NC O2.  Mucinex.  Continue Montelukast.

## 2018-12-13 LAB
ANION GAP SERPL CALC-SCNC: 10 MMOL/L — SIGNIFICANT CHANGE UP (ref 5–17)
BASOPHILS # BLD AUTO: 0.03 K/UL — SIGNIFICANT CHANGE UP (ref 0–0.2)
BASOPHILS NFR BLD AUTO: 0.3 % — SIGNIFICANT CHANGE UP (ref 0–2)
BUN SERPL-MCNC: 39 MG/DL — HIGH (ref 7–23)
CALCIUM SERPL-MCNC: 8.7 MG/DL — SIGNIFICANT CHANGE UP (ref 8.5–10.1)
CHLORIDE SERPL-SCNC: 105 MMOL/L — SIGNIFICANT CHANGE UP (ref 96–108)
CO2 SERPL-SCNC: 25 MMOL/L — SIGNIFICANT CHANGE UP (ref 22–31)
CREAT SERPL-MCNC: 1.53 MG/DL — HIGH (ref 0.5–1.3)
EOSINOPHIL # BLD AUTO: 0.25 K/UL — SIGNIFICANT CHANGE UP (ref 0–0.5)
EOSINOPHIL NFR BLD AUTO: 2.9 % — SIGNIFICANT CHANGE UP (ref 0–6)
GLUCOSE SERPL-MCNC: 89 MG/DL — SIGNIFICANT CHANGE UP (ref 70–99)
HCT VFR BLD CALC: 42.1 % — SIGNIFICANT CHANGE UP (ref 39–50)
HGB BLD-MCNC: 14.1 G/DL — SIGNIFICANT CHANGE UP (ref 13–17)
IMM GRANULOCYTES NFR BLD AUTO: 0.3 % — SIGNIFICANT CHANGE UP (ref 0–1.5)
LYMPHOCYTES # BLD AUTO: 1.24 K/UL — SIGNIFICANT CHANGE UP (ref 1–3.3)
LYMPHOCYTES # BLD AUTO: 14.2 % — SIGNIFICANT CHANGE UP (ref 13–44)
M PNEUMO IGM SER-ACNC: 162 UNITS/ML — SIGNIFICANT CHANGE UP
MCHC RBC-ENTMCNC: 30.7 PG — SIGNIFICANT CHANGE UP (ref 27–34)
MCHC RBC-ENTMCNC: 33.5 GM/DL — SIGNIFICANT CHANGE UP (ref 32–36)
MCV RBC AUTO: 91.7 FL — SIGNIFICANT CHANGE UP (ref 80–100)
MONOCYTES # BLD AUTO: 0.86 K/UL — SIGNIFICANT CHANGE UP (ref 0–0.9)
MONOCYTES NFR BLD AUTO: 9.8 % — SIGNIFICANT CHANGE UP (ref 2–14)
MYCOPLASMA AG SPEC QL: NEGATIVE — SIGNIFICANT CHANGE UP
NEUTROPHILS # BLD AUTO: 6.35 K/UL — SIGNIFICANT CHANGE UP (ref 1.8–7.4)
NEUTROPHILS NFR BLD AUTO: 72.5 % — SIGNIFICANT CHANGE UP (ref 43–77)
NRBC # BLD: 0 /100 WBCS — SIGNIFICANT CHANGE UP (ref 0–0)
PLATELET # BLD AUTO: 255 K/UL — SIGNIFICANT CHANGE UP (ref 150–400)
POTASSIUM SERPL-MCNC: 4.3 MMOL/L — SIGNIFICANT CHANGE UP (ref 3.5–5.3)
POTASSIUM SERPL-SCNC: 4.3 MMOL/L — SIGNIFICANT CHANGE UP (ref 3.5–5.3)
RBC # BLD: 4.59 M/UL — SIGNIFICANT CHANGE UP (ref 4.2–5.8)
RBC # FLD: 14 % — SIGNIFICANT CHANGE UP (ref 10.3–14.5)
SODIUM SERPL-SCNC: 140 MMOL/L — SIGNIFICANT CHANGE UP (ref 135–145)
WBC # BLD: 8.76 K/UL — SIGNIFICANT CHANGE UP (ref 3.8–10.5)
WBC # FLD AUTO: 8.76 K/UL — SIGNIFICANT CHANGE UP (ref 3.8–10.5)

## 2018-12-13 PROCEDURE — 99233 SBSQ HOSP IP/OBS HIGH 50: CPT

## 2018-12-13 PROCEDURE — 93010 ELECTROCARDIOGRAM REPORT: CPT

## 2018-12-13 RX ADMIN — Medication 600 MILLIGRAM(S): at 18:19

## 2018-12-13 RX ADMIN — AZITHROMYCIN 255 MILLIGRAM(S): 500 TABLET, FILM COATED ORAL at 14:40

## 2018-12-13 RX ADMIN — PANTOPRAZOLE SODIUM 40 MILLIGRAM(S): 20 TABLET, DELAYED RELEASE ORAL at 05:10

## 2018-12-13 RX ADMIN — CEFTRIAXONE 1000 MILLIGRAM(S): 500 INJECTION, POWDER, FOR SOLUTION INTRAMUSCULAR; INTRAVENOUS at 14:40

## 2018-12-13 RX ADMIN — ATORVASTATIN CALCIUM 10 MILLIGRAM(S): 80 TABLET, FILM COATED ORAL at 22:05

## 2018-12-13 RX ADMIN — Medication 600 MILLIGRAM(S): at 05:10

## 2018-12-13 RX ADMIN — Medication 81 MILLIGRAM(S): at 11:06

## 2018-12-13 RX ADMIN — MONTELUKAST 10 MILLIGRAM(S): 4 TABLET, CHEWABLE ORAL at 11:06

## 2018-12-13 RX ADMIN — HEPARIN SODIUM 5000 UNIT(S): 5000 INJECTION INTRAVENOUS; SUBCUTANEOUS at 22:05

## 2018-12-13 RX ADMIN — HEPARIN SODIUM 5000 UNIT(S): 5000 INJECTION INTRAVENOUS; SUBCUTANEOUS at 11:05

## 2018-12-14 ENCOUNTER — TRANSCRIPTION ENCOUNTER (OUTPATIENT)
Age: 83
End: 2018-12-14

## 2018-12-14 VITALS — WEIGHT: 144.84 LBS

## 2018-12-14 LAB
ANION GAP SERPL CALC-SCNC: 8 MMOL/L — SIGNIFICANT CHANGE UP (ref 5–17)
BUN SERPL-MCNC: 40 MG/DL — HIGH (ref 7–23)
CALCIUM SERPL-MCNC: 8.5 MG/DL — SIGNIFICANT CHANGE UP (ref 8.5–10.1)
CHLORIDE SERPL-SCNC: 107 MMOL/L — SIGNIFICANT CHANGE UP (ref 96–108)
CO2 SERPL-SCNC: 26 MMOL/L — SIGNIFICANT CHANGE UP (ref 22–31)
CREAT SERPL-MCNC: 1.38 MG/DL — HIGH (ref 0.5–1.3)
GLUCOSE SERPL-MCNC: 94 MG/DL — SIGNIFICANT CHANGE UP (ref 70–99)
POTASSIUM SERPL-MCNC: 4.6 MMOL/L — SIGNIFICANT CHANGE UP (ref 3.5–5.3)
POTASSIUM SERPL-SCNC: 4.6 MMOL/L — SIGNIFICANT CHANGE UP (ref 3.5–5.3)
SODIUM SERPL-SCNC: 141 MMOL/L — SIGNIFICANT CHANGE UP (ref 135–145)

## 2018-12-14 PROCEDURE — 71045 X-RAY EXAM CHEST 1 VIEW: CPT | Mod: 26

## 2018-12-14 PROCEDURE — 99233 SBSQ HOSP IP/OBS HIGH 50: CPT

## 2018-12-14 RX ORDER — AZITHROMYCIN 500 MG/1
1 TABLET, FILM COATED ORAL
Qty: 0 | Refills: 0 | COMMUNITY
Start: 2018-12-14 | End: 2018-12-16

## 2018-12-14 RX ORDER — CEFUROXIME AXETIL 250 MG
1 TABLET ORAL
Qty: 8 | Refills: 0 | OUTPATIENT
Start: 2018-12-14 | End: 2018-12-17

## 2018-12-14 RX ORDER — CEFUROXIME AXETIL 250 MG
1 TABLET ORAL
Qty: 0 | Refills: 0 | COMMUNITY
Start: 2018-12-14 | End: 2018-12-17

## 2018-12-14 RX ORDER — FUROSEMIDE 40 MG
1 TABLET ORAL
Qty: 30 | Refills: 0 | OUTPATIENT
Start: 2018-12-14 | End: 2019-01-12

## 2018-12-14 RX ORDER — AZITHROMYCIN 500 MG/1
1 TABLET, FILM COATED ORAL
Qty: 2 | Refills: 0 | OUTPATIENT
Start: 2018-12-14 | End: 2018-12-15

## 2018-12-14 RX ORDER — MELOXICAM 15 MG/1
1 TABLET ORAL
Qty: 0 | Refills: 0 | COMMUNITY

## 2018-12-14 RX ADMIN — Medication 600 MILLIGRAM(S): at 06:40

## 2018-12-14 RX ADMIN — MONTELUKAST 10 MILLIGRAM(S): 4 TABLET, CHEWABLE ORAL at 11:09

## 2018-12-14 RX ADMIN — Medication 81 MILLIGRAM(S): at 11:09

## 2018-12-14 RX ADMIN — PANTOPRAZOLE SODIUM 40 MILLIGRAM(S): 20 TABLET, DELAYED RELEASE ORAL at 06:40

## 2018-12-14 RX ADMIN — HEPARIN SODIUM 5000 UNIT(S): 5000 INJECTION INTRAVENOUS; SUBCUTANEOUS at 11:09

## 2018-12-14 NOTE — DISCHARGE NOTE ADULT - HOSPITAL COURSE
· Subjective and Objective: 	  89 y/o male with a PMHx of SVT s/p ablation 5 yrs ago at [facility],  right hip replacement  and revision, and chronic right hip pain on pain medication, hard of hearing presented to the ED c/o cough x2 months. Pt saw Dr. Posadas 3 weeks ago for an itchy throat, cough and sneezing, was given allergy medication. Recently, cough became productive with green phlegm and some wheezing associated  with B/l lower extremity edema. Pt went to  in [place] today and CXR showed fluid in his left lung and was sent to the ED for further evaluation.  ROS- patient  denies any chest pain or SOB ,denies abdominal pain or fever or chills, no dysuria   In ED patient afebrile , found to have  B/L pleural effusions L>R , cannot exclude LLL and lingular PNA  On labs No leukocytosis,  BALDO BUN 28/cr 1.34, BNP 3500, troponin elevated upto 0.1  EKG sinus rhthym with PVC 's old septal infarct unchanged from EKG done Oct 2018    12/14- denies SOB or chestpain, cough improved   Constitutional: NAD  	HEENT: Atraumatic, SHIRLEY, Normal, No congestion  	Respiratory: ,improved air entry upper lung field, no wheezing  	Cardiovascular: N S1S2;  	Gastrointestinal: Abdomen soft, non tender, Bowel Ssounds present  	Extremities: 2+ B/l Lower extremity pitting edema  	Neurological: AAOriented to place, person, follows commands ,no gross focal motor deficits  	Back: No CVA tenderness   	Musculoskeletal: non tender  	Breasts: Deferred  · Assessment		  89 y/o male with a PMHx of SVT s/p ablation 5 yrs ago at [facility],  right hip replacement  and revision, and chronic right hip pain on pain medication, hard of hearing presented to the ED c/o cough x2 months. Pt saw Dr. Posadas 3 weeks ago for an itchy throat, cough and sneezing, was given allergy medication. Recently, cough became productive with green phlegm and some wheezing associated  with B/l lower extremity edema. Pt went to  in [place] today and CXR showed fluid in his left lung and was sent to the ED for further evaluation.  ROS- patient  denies any chest pain or SOB ,denies abdominal pain or fever or chills, no dysuria   In ED patient afebrile , found to have  B/L pleural effusions L>R , cannot exclude LLL and lingular PNAOn labs No leukocytosis,  BALDO BUN 28/cr 1.34, BNP 3500, troponin elevated upto 0.1  EKG sinus rhthym with PVC 's old septal infarct unchanged from EKG done Oct 2018    A/P  #Acute Diastolic  Heart failure- now clinically compensated  / /no Prior hx of CHF  treated with IV lasix  now switch to lasix 20mg daily starting 12/16./18  lasix has been held transiently until 12/16 due to worsening of prerenal azotemia  patient would hannah f/u of BMP within1  week  plan for Caridomems and ischemic w/u  as per cardiology as outpatient  f/u cardiology in 1 week  no ACEI due to prerenal azotemia  continue asa, statin  -Echo LVEF nl          #acute superimposed community acquired PNA LLLand lingular community acquired Pneumonia/ suspect GNR and atypical   # B/L pleural effsuions L>R  -s/p  IV ceftriaxone and IV zithromax  switched to ceftin 500mg BID for 4 more days to complete total 7 days  and azithromycin for 2 more days to complet 5 days  mucinex-Blood cx neg  f/u pulm in 1 week      #BALDO likely prerenal azotemia from diuresis /No  prior labs to compare with-  now improving after trannsiently holding lasix     #Elevated troponins  with no significant new acute EKG changes ,no chest pain - Likely demand ischemia    discharge time spent 45 mins   spoke with patient and wife at bedside and RN and DR desai

## 2018-12-14 NOTE — PROGRESS NOTE ADULT - SUBJECTIVE AND OBJECTIVE BOX
HPI:  89 y/o male with a PMHx of SVT s/p ablation 5 yrs ago at [place], hx right hip replacement  and revision, and chronic right hip pain on pain medication, hard of hearing presented to the ED c/o cough x2 months. Pt saw Dr. Posadas 3 weeks ago for an itchy throat, cough and sneezing, was given allergy medication. Recently, cough became productive with green phlegm and some wheezing associated  with B/l lower extremity edema. Pt went to  in [place] today and CXR showed fluid in his left lung and was sent to the ED for further evaluation.  ROS- patient  denies any chest pain or SOB ,denies abdominal pain or fever or chills, no dysuria   In ED patient afebrile , found to have  B/L pleural effusions L>R , cannot exclude LLL and lingular PNA  On labs No leukocytosis,  BALDO BUN 28/cr 1.34, BNP 3500, troponin elevated upto 0.1  EKG sinus rhthym with PVC 's old septal infarct unchanged from EKG done Oct 2018    Pt is nonsmoker, no h.o. asthma. notes some orthopnea. has LE edema.     :  cough mildly improving today.  has yellow sputum.   :  cough improving, sputum is white. no distress.  : not SOB, no distress, sitting in chair.    Pmhx- see hpi   Pshx- Left ankle surgery, R hip replacemnt and revision , tonsillectomy, radiofrequency cardio ablation for SVT 5 yrs ago at [facility]  social hx- denies etoh use, never smoker, denies recreational drug use  family hx- hx lung cancer brother, hx of colon cancer 2nd brother (11 Dec 2018 17:15)      PAST MEDICAL & SURGICAL HISTORY:  [place] (hard of hearing)      MEDICATIONS  (STANDING):  aspirin enteric coated 81 milliGRAM(s) Oral daily  atorvastatin 10 milliGRAM(s) Oral at bedtime  azithromycin  IVPB 500 milliGRAM(s) IV Intermittent every 24 hours  cefTRIAXone Injectable. 1000 milliGRAM(s) IV Push every 24 hours  enoxaparin Injectable 30 milliGRAM(s) SubCutaneous every 24 hours  furosemide   Injectable 40 milliGRAM(s) IV Push daily  montelukast 10 milliGRAM(s) Oral daily  pantoprazole    Tablet 40 milliGRAM(s) Oral before breakfast    MEDICATIONS  (PRN):      Allergies    No Known Allergies    Intolerances        SOCIAL HISTORY: Denies tobacco, etoh abuse or illicit drug use    FAMILY HISTORY:      Vital Signs Last 24 Hrs  T(C): 36.6 (12 Dec 2018 04:45), Max: 36.7 (11 Dec 2018 12:02)  T(F): 97.8 (12 Dec 2018 04:45), Max: 98 (11 Dec 2018 12:02)  HR: 89 (12 Dec 2018 04:45) (82 - 101)  BP: 136/82 (12 Dec 2018 04:45) (109/81 - 155/97)  BP(mean): --  RR: 18 (12 Dec 2018 04:45) (18 - 21)  SpO2: 95% (12 Dec 2018 04:45) (95% - 100%)    REVIEW OF SYSTEMS:    CONSTITUTIONAL:  As per HPI.  HEENT:  Eyes:  No diplopia or blurred vision. ENT:  No earache, sore throat or runny nose.  CARDIOVASCULAR:  No pressure, squeezing, tightness, heaviness or aching about the chest, neck, axilla or epigastrium.  RESPIRATORY:  see above.   GASTROINTESTINAL:  No nausea, vomiting or diarrhea.  GENITOURINARY:  No dysuria, frequency or urgency.  MUSCULOSKELETAL:  As per HPI.  SKIN:  No change in skin, hair or nails.  NEUROLOGIC:  No paresthesias, fasciculations, seizures or weakness.  PSYCHIATRIC:  No disorder of thought or mood.  ENDOCRINE:  No heat or cold intolerance, polyuria or polydipsia.  HEMATOLOGICAL:  No easy bruising or bleedings:  .     PHYSICAL EXAMINATION:    GENERAL APPEARANCE:  Pt. is not currently dyspneic, in no distress. Pt. is alert, oriented, and pleasant.  HEENT:  Pupils are normal and react normally. No icterus. Mucous membranes well colored.  NECK:  Supple. No lymphadenopathy. Jugular venous pressure not elevated. Carotids equal.   HEART:   The cardiac impulse has a normal quality. Regular. Normal S1 and S2. There are no murmurs, rubs or gallops noted  CHEST:  Decreased BS's L greater than R base.    ABDOMEN:  Soft and nontender.   EXTREMITIES:  There is no cyanosis, clubbing.  Tr LE edema.  SKIN:  No rash or significant lesions are noted.  Neuro: Alert, awake, and O x 3.      LABS:                        13.9   9.63  )-----------( 268      ( 12 Dec 2018 05:51 )             42.6     12-    141  |  107  |  29<H>  ----------------------------<  88  4.9   |  26  |  1.37<H>    Ca    8.9      12 Dec 2018 05:51    TPro  7.3  /  Alb  3.5  /  TBili  0.6  /  DBili  x   /  AST  32  /  ALT  29  /  AlkPhos  89  12    LIVER FUNCTIONS - ( 11 Dec 2018 11:08 )  Alb: 3.5 g/dL / Pro: 7.3 gm/dL / ALK PHOS: 89 U/L / ALT: 29 U/L / AST: 32 U/L / GGT: x           PT/INR - ( 11 Dec 2018 11:08 )   PT: 12.3 sec;   INR: 1.10 ratio           CARDIAC MARKERS ( 11 Dec 2018 14:09 )  0.100 ng/mL / x     / x     / x     / x      CARDIAC MARKERS ( 11 Dec 2018 11:08 )  0.094 ng/mL / x     / x     / x     / x          Urinalysis Basic - ( 11 Dec 2018 14:46 )    Color: Yellow / Appearance: Clear / S.015 / pH: x  Gluc: x / Ketone: Negative  / Bili: Negative / Urobili: Negative mg/dL   Blood: x / Protein: 15 mg/dL / Nitrite: Negative   Leuk Esterase: Negative / RBC: Negative /HPF / WBC 0-2   Sq Epi: x / Non Sq Epi: x / Bacteria: x          RADIOLOGY & ADDITIONAL STUDIES:       INTERPRETATION:  Chest CT without contrast dated 2018.    COMPARISON: None available.    CLINICAL INFORMATION: Cough.    TECHNIQUE: Contiguous axial 2.5 mm slice thickness images of the chest   were obtained without intravenous contrast administration.    FINDINGS:    The airway shows normal caliber and contour with patent lumen.    There are bilateral pleural effusions, larger on the left. Near complete   atelectasis of the left lower lobe. The superior segment of the left   lower lobe is partially aerated. Partial atelectasis of the right lower   lobe and also atelectatic changes in the lingula. A 3 mm likely   atelectatic nodule in the superior right upper lobe, image #3/28 and a 3   mm nodule in the inferior right upper lobe on image #3/54. Interlobular   septal thickening is visualized in the upper and the aerated lower lobes,   likely indicative of mild pulmonary vascular congestion. Clinical   correlation is recommended. Underlying pneumonia in the lingula and the   left lower  lobe  can not be excluded. Clinical correlation is   recommended.    The mediastinum great vessels, ectasia and calcified plaques in the   thoracic aorta.  Cardiomegaly. There is no pericardial effusion  A limited evaluation of the upper abdomen, 3.3 cm cyst in the posterior   cortex of there right kidney. A 2.5 cm cyst in the posterior cortex of   the left kidney.    The bones , hypertrophic degenerative changes in the thoracic spine.    IMPRESSION:   Bilateral pleural effusions and atelectatic changes in the lower lobes   and the lingula as described. Cardiomegaly. Findings  are likely   indicative of congestive heart failure for which clinical correlation is   recommended.  Left lower lobe and lingular pneumonia cannot be excluded.
HPI:  89 y/o male with a PMHx of SVT s/p ablation 5 yrs ago at [place], hx right hip replacement  and revision, and chronic right hip pain on pain medication, hard of hearing presented to the ED c/o cough x2 months. Pt saw Dr. Posadas 3 weeks ago for an itchy throat, cough and sneezing, was given allergy medication. Recently, cough became productive with green phlegm and some wheezing associated  with B/l lower extremity edema. Pt went to  in [place] today and CXR showed fluid in his left lung and was sent to the ED for further evaluation.  ROS- patient  denies any chest pain or SOB ,denies abdominal pain or fever or chills, no dysuria   In ED patient afebrile , found to have  B/L pleural effusions L>R , cannot exclude LLL and lingular PNA  On labs No leukocytosis,  BALDO BUN 28/cr 1.34, BNP 3500, troponin elevated upto 0.1  EKG sinus rhthym with PVC 's old septal infarct unchanged from EKG done Oct 2018    Pt is nonsmoker, no h.o. asthma. notes some orthopnea. has LE edema.     :  cough mildly improving today.  has yellow sputum.   :  cough improving, sputum is white. no distress.    Pmhx- see hpi   Pshx- Left ankle surgery, R hip replacemnt and revision , tonsillectomy, radiofrequency cardio ablation for SVT 5 yrs ago at [facility]  social hx- denies etoh use, never smoker, denies recreational drug use  family hx- hx lung cancer brother, hx of colon cancer 2nd brother (11 Dec 2018 17:15)      PAST MEDICAL & SURGICAL HISTORY:  [place] (hard of hearing)      MEDICATIONS  (STANDING):  aspirin enteric coated 81 milliGRAM(s) Oral daily  atorvastatin 10 milliGRAM(s) Oral at bedtime  azithromycin  IVPB 500 milliGRAM(s) IV Intermittent every 24 hours  cefTRIAXone Injectable. 1000 milliGRAM(s) IV Push every 24 hours  enoxaparin Injectable 30 milliGRAM(s) SubCutaneous every 24 hours  furosemide   Injectable 40 milliGRAM(s) IV Push daily  montelukast 10 milliGRAM(s) Oral daily  pantoprazole    Tablet 40 milliGRAM(s) Oral before breakfast    MEDICATIONS  (PRN):      Allergies    No Known Allergies    Intolerances        SOCIAL HISTORY: Denies tobacco, etoh abuse or illicit drug use    FAMILY HISTORY:      Vital Signs Last 24 Hrs  T(C): 36.6 (12 Dec 2018 04:45), Max: 36.7 (11 Dec 2018 12:02)  T(F): 97.8 (12 Dec 2018 04:45), Max: 98 (11 Dec 2018 12:02)  HR: 89 (12 Dec 2018 04:45) (82 - 101)  BP: 136/82 (12 Dec 2018 04:45) (109/81 - 155/97)  BP(mean): --  RR: 18 (12 Dec 2018 04:45) (18 - 21)  SpO2: 95% (12 Dec 2018 04:45) (95% - 100%)    REVIEW OF SYSTEMS:    CONSTITUTIONAL:  As per HPI.  HEENT:  Eyes:  No diplopia or blurred vision. ENT:  No earache, sore throat or runny nose.  CARDIOVASCULAR:  No pressure, squeezing, tightness, heaviness or aching about the chest, neck, axilla or epigastrium.  RESPIRATORY:  see above.   GASTROINTESTINAL:  No nausea, vomiting or diarrhea.  GENITOURINARY:  No dysuria, frequency or urgency.  MUSCULOSKELETAL:  As per HPI.  SKIN:  No change in skin, hair or nails.  NEUROLOGIC:  No paresthesias, fasciculations, seizures or weakness.  PSYCHIATRIC:  No disorder of thought or mood.  ENDOCRINE:  No heat or cold intolerance, polyuria or polydipsia.  HEMATOLOGICAL:  No easy bruising or bleedings:  .     PHYSICAL EXAMINATION:    GENERAL APPEARANCE:  Pt. is not currently dyspneic, in no distress. Pt. is alert, oriented, and pleasant.  HEENT:  Pupils are normal and react normally. No icterus. Mucous membranes well colored.  NECK:  Supple. No lymphadenopathy. Jugular venous pressure not elevated. Carotids equal.   HEART:   The cardiac impulse has a normal quality. Regular. Normal S1 and S2. There are no murmurs, rubs or gallops noted  CHEST:  Decreased BS's in bases.    ABDOMEN:  Soft and nontender.   EXTREMITIES:  There is no cyanosis, clubbing.  Tr-1 plus LE edema.  SKIN:  No rash or significant lesions are noted.  Neuro: Alert, awake, and O x 3.      LABS:                        13.9   9.63  )-----------( 268      ( 12 Dec 2018 05:51 )             42.6         141  |  107  |  29<H>  ----------------------------<  88  4.9   |  26  |  1.37<H>    Ca    8.9      12 Dec 2018 05:51    TPro  7.3  /  Alb  3.5  /  TBili  0.6  /  DBili  x   /  AST  32  /  ALT  29  /  AlkPhos  89  12-11    LIVER FUNCTIONS - ( 11 Dec 2018 11:08 )  Alb: 3.5 g/dL / Pro: 7.3 gm/dL / ALK PHOS: 89 U/L / ALT: 29 U/L / AST: 32 U/L / GGT: x           PT/INR - ( 11 Dec 2018 11:08 )   PT: 12.3 sec;   INR: 1.10 ratio           CARDIAC MARKERS ( 11 Dec 2018 14:09 )  0.100 ng/mL / x     / x     / x     / x      CARDIAC MARKERS ( 11 Dec 2018 11:08 )  0.094 ng/mL / x     / x     / x     / x          Urinalysis Basic - ( 11 Dec 2018 14:46 )    Color: Yellow / Appearance: Clear / S.015 / pH: x  Gluc: x / Ketone: Negative  / Bili: Negative / Urobili: Negative mg/dL   Blood: x / Protein: 15 mg/dL / Nitrite: Negative   Leuk Esterase: Negative / RBC: Negative /HPF / WBC 0-2   Sq Epi: x / Non Sq Epi: x / Bacteria: x          RADIOLOGY & ADDITIONAL STUDIES:       INTERPRETATION:  Chest CT without contrast dated 2018.    COMPARISON: None available.    CLINICAL INFORMATION: Cough.    TECHNIQUE: Contiguous axial 2.5 mm slice thickness images of the chest   were obtained without intravenous contrast administration.    FINDINGS:    The airway shows normal caliber and contour with patent lumen.    There are bilateral pleural effusions, larger on the left. Near complete   atelectasis of the left lower lobe. The superior segment of the left   lower lobe is partially aerated. Partial atelectasis of the right lower   lobe and also atelectatic changes in the lingula. A 3 mm likely   atelectatic nodule in the superior right upper lobe, image #3/28 and a 3   mm nodule in the inferior right upper lobe on image #3/54. Interlobular   septal thickening is visualized in the upper and the aerated lower lobes,   likely indicative of mild pulmonary vascular congestion. Clinical   correlation is recommended. Underlying pneumonia in the lingula and the   left lower  lobe  can not be excluded. Clinical correlation is   recommended.    The mediastinum great vessels, ectasia and calcified plaques in the   thoracic aorta.  Cardiomegaly. There is no pericardial effusion  A limited evaluation of the upper abdomen, 3.3 cm cyst in the posterior   cortex of there right kidney. A 2.5 cm cyst in the posterior cortex of   the left kidney.    The bones , hypertrophic degenerative changes in the thoracic spine.    IMPRESSION:   Bilateral pleural effusions and atelectatic changes in the lower lobes   and the lingula as described. Cardiomegaly. Findings  are likely   indicative of congestive heart failure for which clinical correlation is   recommended.  Left lower lobe and lingular pneumonia cannot be excluded.
Patient is a 89y old  Male who presents with a chief complaint of cough, wheezing.   HPI:  89 y/o male with a PMHx of SVT s/p ablation 5 yrs ago at [place], hx right hip replacement  and revision, and chronic right hip pain on pain medication, hard of hearing presented to the ED c/o cough x2 months. Pt saw Dr. Posadas 3 weeks ago for an itchy throat, cough and sneezing, was given allergy medication. Recently, cough became productive with green phlegm and some wheezing associated  with B/l lower extremity edema. Pt went to  in [place] today and CXR showed fluid in his left lung and was sent to the ED for further evaluation.    In ED patient afebrile , found to have  B/L pleural effusions L>R , cannot exclude LLL and lingular PNA  On labs No leukocytosis,  BALDO BUN 28/cr 1.34, BNP 3500, troponin elevated upto 0.1    Cardiology team consulted for evaluation for CHF.    Pt states that he had been diuresing since presentation since diuretic was given to him.  He denies any CP or pressure or dizziness.  His daughter at bedside and she agreed with the history provided.    - pt seen and examined by me today. Pt denies any CP or SOB at rest.    - pt seen and examined by me today. Pt denies any symptoms this am.       Pshx- Left ankle surgery, R hip replacement and revision , tonsillectomy, radiofrequency cardio ablation for SVT 5 yrs ago at [facility]  social hx- denies etoh use, never smoker, denies recreational drug use  family hx- hx lung cancer brother, hx of colon cancer 2nd brother         MEDICATIONS  (STANDING):  aspirin enteric coated 81 milliGRAM(s) Oral daily  atorvastatin 10 milliGRAM(s) Oral at bedtime  azithromycin  IVPB 500 milliGRAM(s) IV Intermittent every 24 hours  cefTRIAXone Injectable. 1000 milliGRAM(s) IV Push every 24 hours  enoxaparin Injectable 30 milliGRAM(s) SubCutaneous every 24 hours  furosemide   Injectable 40 milliGRAM(s) IV Push daily  montelukast 10 milliGRAM(s) Oral daily  pantoprazole    Tablet 40 milliGRAM(s) Oral before breakfast        REVIEW OF SYSTEMS:  CONSTITUTIONAL:    No fatigue, malaise, lethargy.  No fever or chills.  HEENT:  Eyes:  No visual changes.     ENT:  No epistaxis.  No sinus pain.    RESPIRATORY:  c/o cough.  No wheeze.  No hemoptysis.  no shortness of breath.  CARDIOVASCULAR:  No chest pains.  No palpitations. No shortness of breath, No orthopnea or PND.  GASTROINTESTINAL:  No abdominal pain.  No nausea or vomiting.    GENITOURINARY:    No hematuria.    MUSCULOSKELETAL:  No musculoskeletal pain.  No joint swelling.  No arthritis.  NEUROLOGICAL:  No tingling or numbness or weakness.  PSYCHIATRIC:  No confusion  SKIN:  No rashes.    ENDOCRINE:  No unexplained weight loss.  No polydipsia.   HEMATOLOGIC:  No anemia.  No prolonged or excessive bleeding.   ALLERGIC AND IMMUNOLOGIC:  No pruritus.          Vital Signs Last 24 Hrs  T(C): 36.6 (12 Dec 2018 04:45), Max: 36.7 (11 Dec 2018 12:02)  T(F): 97.8 (12 Dec 2018 04:45), Max: 98 (11 Dec 2018 12:02)  HR: 89 (12 Dec 2018 04:45) (82 - 101)  BP: 136/82 (12 Dec 2018 04:45) (109/81 - 155/97)  BP(mean): --  RR: 18 (12 Dec 2018 04:45) (18 - 21)  SpO2: 95% (12 Dec 2018 04:45) (95% - 100%)    PHYSICAL EXAM-    Constitutional: no acute distress    Head: Head is normocephalic and atraumatic.      Neck: positive JVD.     Cardiovascular: Regular rate and rhythm without S3, S4. No murmurs or rubs are appreciated.      Respiratory: Breath sounds are decreased at bases.    Abdomen: Soft, nontender, nondistended with positive bowel sounds.      Extremity: No tenderness. No  pitting edema     Neurologic: The patient is alert and oriented.      Skin: No rash, no obvious lesions noted.      Psychiatric: The patient appears to be emotionally stable.      INTERPRETATION OF TELEMETRY: sinus rythm    ECG: Sinus rythm , poor R wave progression, PVCs.     I&O's Detail      LABS:                        13.9   9.63  )-----------( 268      ( 12 Dec 2018 05:51 )             42.6     12-11    144  |  112<H>  |  28<H>  ----------------------------<  92  4.8   |  24  |  1.34<H>    Ca    9.1      11 Dec 2018 11:08    TPro  7.3  /  Alb  3.5  /  TBili  0.6  /  DBili  x   /  AST  32  /  ALT  29  /  AlkPhos  89  12-11    CARDIAC MARKERS ( 11 Dec 2018 14:09 )  0.100 ng/mL / x     / x     / x     / x      CARDIAC MARKERS ( 11 Dec 2018 11:08 )  0.094 ng/mL / x     / x     / x     / x          PT/INR - ( 11 Dec 2018 11:08 )   PT: 12.3 sec;   INR: 1.10 ratio           Urinalysis Basic - ( 11 Dec 2018 14:46 )    Color: Yellow / Appearance: Clear / S.015 / pH: x  Gluc: x / Ketone: Negative  / Bili: Negative / Urobili: Negative mg/dL   Blood: x / Protein: 15 mg/dL / Nitrite: Negative   Leuk Esterase: Negative / RBC: Negative /HPF / WBC 0-2   Sq Epi: x / Non Sq Epi: x / Bacteria: x      I&O's Summary    BNPSerum Pro-Brain Natriuretic Peptide: 3539 pg/mL ( @ 11:08)    RADIOLOGY & ADDITIONAL STUDIES:  < from: CT Chest No Cont (18 @ 13:05) >    EXAM:  CT CHEST                            PROCEDURE DATE:  2018          INTERPRETATION:  Chest CT without contrast dated 2018.    COMPARISON: None available.    CLINICAL INFORMATION: Cough.    TECHNIQUE: Contiguous axial 2.5 mm slice thickness images of the chest   were obtained without intravenous contrast administration.    FINDINGS:    The airway shows normal caliber and contour with patent lumen.    There are bilateral pleural effusions, larger on the left. Near complete   atelectasis of the left lower lobe. The superior segment of the left   lower lobe is partially aerated. Partial atelectasis of the right lower   lobe and also atelectatic changes in the lingula. A 3 mm likely   atelectatic nodule in the superior right upper lobe, image #3/28 and a 3   mm nodule in the inferior right upper lobe on image #3/54. Interlobular   septal thickening is visualized in the upper and the aerated lower lobes,   likely indicative of mild pulmonary vascular congestion. Clinical   correlation is recommended. Underlying pneumonia in the lingula and the   left lower  lobe  can not be excluded. Clinical correlation is   recommended.    The mediastinum great vessels, ectasia and calcified plaques in the   thoracic aorta.  Cardiomegaly. There is no pericardial effusion  A limited evaluation of the upper abdomen, 3.3 cm cyst in the posterior   cortex of there right kidney. A 2.5 cm cyst in the posterior cortex of   the left kidney.    The bones , hypertrophic degenerative changes in the thoracic spine.    IMPRESSION:   Bilateral pleural effusions and atelectatic changes in the lower lobes   and the lingula as described. Cardiomegaly. Findings  are likely   indicative of congestive heart failure for which clinical correlation is   recommended.  Left lower lobe and lingular pneumonia cannot be excluded.                  NELIDA COKER M.D., ATTENDING RADIOLOGIST  This document has been electronically signed. Dec 11 2018  1:58PM                < end of copied text >  < from: Transthoracic Echocardiogram (18 @ 10:16) >     EXAM:  ECHO TTE WO CON COMP W DOP         PROCEDURE DATE:  2018        INTERPRETATION:  Transthoracic Echocardiography Report (TTE)     Demographics     Patient name       GERMÁN HAMMONDS   Age           89 year(s)     Med Rec #          [id number]          Gender        Male     Account #          [id number]            Date of Birth 1923     Interpreting       Jerrell Alcala MD Room Number   0321   Physician          Jerrell Alcala MD     Referring          Leonard Martins Sonographer   Physician RODRIGUEZ Pryor                             [facility]     Date of study      2018 09:54                      AM     Height             62.99 in           Weight        145.51 pounds    Type of Study:     TTE procedure: ECHO TTE WO CON COMP W DOP     BP: 136/82 mmHg     Study Location: 3NTechnical Quality: Good    Indications   1) I50.9 - Heart failure    M-Mode Measurements (cm)     LVEDd: 4.38 cm              LVESd: 3.08 cm   IVSEd: 1.52 cm   LVPWd: 1.45 cm             AO Root Dimension: 3 cm                               ACS: 1 cm                               LA: 3.6 cm                               LVOT: 2 cm    Doppler Measurements:     AV Velocity:185 cm/s                MV Peak E-Wave: 89.8 cm/s   AV Peak Gradient: 13.69 mmHg        MV Peak A-Wave: 56.3 cm/s                                       MV E/A Ratio: 1.6 %   TR Velocity:252 cm/s                MV Peak Gradient: 3.23 mmHg   TR Gradient:25.4016 mmHg   Estimated RAP:5 mmHg   RVSP:43 mmHg     Findings     Mitral Valve   Fibrocalcific changes noted to the mitral valve leaflets with preserved   leaflet excursion.   Moderate (2+) mitral regurgitation is present.     Aortic Valve   Fibrocalcific changes noted to the Aortic valve leafletswith preserved   with minimally restricted leaflet excursion.   Trace aortic regurgitation is present.     Tricuspid Valve   Moderate (2+) tricuspid valve regurgitation is present.     Pulmonic Valve   Normal appearing pulmonic valve structure and function.     Left Atrium   The left atrium is mildly dilated.     Left Ventricle   Mild to Moderate concentric left ventricular hypertrophy is present.   At least Moderate left ventricular apical hypertrophy is present.   Estimated left ventricular ejection fraction is 60-65 %.     Right Atrium   Normal appearing right atrium.     Right Ventricle   Normal appearing right ventricle structure and function.     Pericardial Effusion   No evidence of pericardial effusion.     Pleural Effusion   Pleural effusion - is present..     Miscellaneous   All visualized extra cardiac structures appears to be normal.     Impression     Summary     The left atrium is mildly dilated.   Mild to Moderate concentric left ventricular hypertrophy is present.   At least Moderate left ventricular apical hypertrophy is present.   Estimated left ventricular ejection fraction is 60-65 %.   Normal appearing right atrium.   Normal appearing right ventricle structure and function.   All visualized extra cardiac structures appears to be normal.   No evidence of pericardial effusion.   Pleural effusion - is present..   Fibrocalcific changes noted to the mitral valve leaflets with preserved   leaflet excursion.   Moderate (2+) mitral regurgitation is present.   Fibrocalcific changes noted to the Aortic valve leaflets with preserved   with minimally restricted leaflet excursion.   Trace aortic regurgitation is present.   Moderate (2+) tricuspid valve regurgitation is present.   Normal appearing pulmonic valve structure and function.     Signature     ----------------------------------------------------------------   Electronically signed by Jerrell Alcala MD(Interpreting   physician) on 2018 01:18 PM   ----------------------------------------------------------------    < end of copied text >
Patient is a 89y old  Male who presents with a chief complaint of cough, wheezing.   HPI:  89 y/o male with a PMHx of SVT s/p ablation 5 yrs ago at [place], hx right hip replacement  and revision, and chronic right hip pain on pain medication, hard of hearing presented to the ED c/o cough x2 months. Pt saw Dr. Posadas 3 weeks ago for an itchy throat, cough and sneezing, was given allergy medication. Recently, cough became productive with green phlegm and some wheezing associated  with B/l lower extremity edema. Pt went to  in [place] today and CXR showed fluid in his left lung and was sent to the ED for further evaluation.    In ED patient afebrile , found to have  B/L pleural effusions L>R , cannot exclude LLL and lingular PNA  On labs No leukocytosis,  BALDO BUN 28/cr 1.34, BNP 3500, troponin elevated upto 0.1    Cardiology team consulted for evaluation for CHF.    Pt states that he had been diuresing since presentation since diuretic was given to him.  He denies any CP or pressure or dizziness.  His daughter at bedside and she agreed with the history provided.    - pt seen and examined by me today. Pt denies any CP or SOB at rest.    - pt seen and examined by me today. Pt denies any symptoms this am.  - pt seen and examined by me today. Pt denies any symptoms. NO SOW.        Pshx- Left ankle surgery, R hip replacement and revision , tonsillectomy, radiofrequency cardio ablation for SVT 5 yrs ago at [facility]  social hx- denies etoh use, never smoker, denies recreational drug use  family hx- hx lung cancer brother, hx of colon cancer 2nd brother         MEDICATIONS  (STANDING):  aspirin enteric coated 81 milliGRAM(s) Oral daily  atorvastatin 10 milliGRAM(s) Oral at bedtime  azithromycin  IVPB 500 milliGRAM(s) IV Intermittent every 24 hours  cefTRIAXone Injectable. 1000 milliGRAM(s) IV Push every 24 hours  enoxaparin Injectable 30 milliGRAM(s) SubCutaneous every 24 hours  furosemide   Injectable 40 milliGRAM(s) IV Push daily  montelukast 10 milliGRAM(s) Oral daily  pantoprazole    Tablet 40 milliGRAM(s) Oral before breakfast        REVIEW OF SYSTEMS:  CONSTITUTIONAL:    No fatigue, malaise, lethargy.  No fever or chills.  HEENT:  Eyes:  No visual changes.     ENT:  No epistaxis.  No sinus pain.    RESPIRATORY:  c/o cough.  No wheeze.  No hemoptysis.  no shortness of breath.  CARDIOVASCULAR:  No chest pains.  No palpitations. No shortness of breath, No orthopnea or PND.  GASTROINTESTINAL:  No abdominal pain.  No nausea or vomiting.    GENITOURINARY:    No hematuria.    MUSCULOSKELETAL:  No musculoskeletal pain.  No joint swelling.  No arthritis.  NEUROLOGICAL:  No tingling or numbness or weakness.  PSYCHIATRIC:  No confusion  SKIN:  No rashes.    ENDOCRINE:  No unexplained weight loss.  No polydipsia.   HEMATOLOGIC:  No anemia.  No prolonged or excessive bleeding.   ALLERGIC AND IMMUNOLOGIC:  No pruritus.          Vital Signs Last 24 Hrs  T(C): 36.6 (12 Dec 2018 04:45), Max: 36.7 (11 Dec 2018 12:02)  T(F): 97.8 (12 Dec 2018 04:45), Max: 98 (11 Dec 2018 12:02)  HR: 89 (12 Dec 2018 04:45) (82 - 101)  BP: 136/82 (12 Dec 2018 04:45) (109/81 - 155/97)  BP(mean): --  RR: 18 (12 Dec 2018 04:45) (18 - 21)  SpO2: 95% (12 Dec 2018 04:45) (95% - 100%)    PHYSICAL EXAM-    Constitutional: no acute distress    Head: Head is normocephalic and atraumatic.      Neck: positive JVD.     Cardiovascular: Regular rate and rhythm without S3, S4. No murmurs or rubs are appreciated.      Respiratory: Breath sounds are decreased at bases.    Abdomen: Soft, nontender, nondistended with positive bowel sounds.      Extremity: No tenderness. No  pitting edema     Neurologic: The patient is alert and oriented.      Skin: No rash, no obvious lesions noted.      Psychiatric: The patient appears to be emotionally stable.      INTERPRETATION OF TELEMETRY: sinus rythm    ECG: Sinus rythm , poor R wave progression, PVCs.     I&O's Detail      LABS:                        13.9   9.63  )-----------( 268      ( 12 Dec 2018 05:51 )             42.6     12-11    144  |  112<H>  |  28<H>  ----------------------------<  92  4.8   |  24  |  1.34<H>    Ca    9.1      11 Dec 2018 11:08    TPro  7.3  /  Alb  3.5  /  TBili  0.6  /  DBili  x   /  AST  32  /  ALT  29  /  AlkPhos  89  12-11    CARDIAC MARKERS ( 11 Dec 2018 14:09 )  0.100 ng/mL / x     / x     / x     / x      CARDIAC MARKERS ( 11 Dec 2018 11:08 )  0.094 ng/mL / x     / x     / x     / x          PT/INR - ( 11 Dec 2018 11:08 )   PT: 12.3 sec;   INR: 1.10 ratio           Urinalysis Basic - ( 11 Dec 2018 14:46 )    Color: Yellow / Appearance: Clear / S.015 / pH: x  Gluc: x / Ketone: Negative  / Bili: Negative / Urobili: Negative mg/dL   Blood: x / Protein: 15 mg/dL / Nitrite: Negative   Leuk Esterase: Negative / RBC: Negative /HPF / WBC 0-2   Sq Epi: x / Non Sq Epi: x / Bacteria: x      I&O's Summary    BNPSerum Pro-Brain Natriuretic Peptide: 3539 pg/mL ( @ 11:08)    RADIOLOGY & ADDITIONAL STUDIES:  < from: CT Chest No Cont (18 @ 13:05) >    EXAM:  CT CHEST                            PROCEDURE DATE:  2018          INTERPRETATION:  Chest CT without contrast dated 2018.    COMPARISON: None available.    CLINICAL INFORMATION: Cough.    TECHNIQUE: Contiguous axial 2.5 mm slice thickness images of the chest   were obtained without intravenous contrast administration.    FINDINGS:    The airway shows normal caliber and contour with patent lumen.    There are bilateral pleural effusions, larger on the left. Near complete   atelectasis of the left lower lobe. The superior segment of the left   lower lobe is partially aerated. Partial atelectasis of the right lower   lobe and also atelectatic changes in the lingula. A 3 mm likely   atelectatic nodule in the superior right upper lobe, image #3/28 and a 3   mm nodule in the inferior right upper lobe on image #3/54. Interlobular   septal thickening is visualized in the upper and the aerated lower lobes,   likely indicative of mild pulmonary vascular congestion. Clinical   correlation is recommended. Underlying pneumonia in the lingula and the   left lower  lobe  can not be excluded. Clinical correlation is   recommended.    The mediastinum great vessels, ectasia and calcified plaques in the   thoracic aorta.  Cardiomegaly. There is no pericardial effusion  A limited evaluation of the upper abdomen, 3.3 cm cyst in the posterior   cortex of there right kidney. A 2.5 cm cyst in the posterior cortex of   the left kidney.    The bones , hypertrophic degenerative changes in the thoracic spine.    IMPRESSION:   Bilateral pleural effusions and atelectatic changes in the lower lobes   and the lingula as described. Cardiomegaly. Findings  are likely   indicative of congestive heart failure for which clinical correlation is   recommended.  Left lower lobe and lingular pneumonia cannot be excluded.                  ENLIDA COKER M.D., ATTENDING RADIOLOGIST  This document has been electronically signed. Dec 11 2018  1:58PM                < end of copied text >  < from: Transthoracic Echocardiogram (18 @ 10:16) >     EXAM:  ECHO TTE WO CON COMP W DOP         PROCEDURE DATE:  2018        INTERPRETATION:  Transthoracic Echocardiography Report (TTE)     Demographics     Patient name       GERMÁN HAMMONDS   Age           89 year(s)     Med Rec #          [id number]          Gender        Male     Account #          [id number]            Date of Birth 1923     Interpreting       Jerrell Alcala MD Room Number   0321   Physician          Jerrell Alcala MD     Referring          Leonard Weaveryasmin Sonographer   Physician RODRIGUEZ Pryor                             [facility]     Date of study      2018 09:54                      AM     Height             62.99 in           Weight        145.51 pounds    Type of Study:     TTE procedure: ECHO TTE WO CON COMP W DOP     BP: 136/82 mmHg     Study Location: 3NTechnical Quality: Good    Indications   1) I50.9 - Heart failure    M-Mode Measurements (cm)     LVEDd: 4.38 cm              LVESd: 3.08 cm   IVSEd: 1.52 cm   LVPWd: 1.45 cm             AO Root Dimension: 3 cm                               ACS: 1 cm                               LA: 3.6 cm                               LVOT: 2 cm    Doppler Measurements:     AV Velocity:185 cm/s                MV Peak E-Wave: 89.8 cm/s   AV Peak Gradient: 13.69 mmHg        MV Peak A-Wave: 56.3 cm/s                                       MV E/A Ratio: 1.6 %   TR Velocity:252 cm/s                MV Peak Gradient: 3.23 mmHg   TR Gradient:25.4016 mmHg   Estimated RAP:5 mmHg   RVSP:43 mmHg     Findings     Mitral Valve   Fibrocalcific changes noted to the mitral valve leaflets with preserved   leaflet excursion.   Moderate (2+) mitral regurgitation is present.     Aortic Valve   Fibrocalcific changes noted to the Aortic valve leafletswith preserved   with minimally restricted leaflet excursion.   Trace aortic regurgitation is present.     Tricuspid Valve   Moderate (2+) tricuspid valve regurgitation is present.     Pulmonic Valve   Normal appearing pulmonic valve structure and function.     Left Atrium   The left atrium is mildly dilated.     Left Ventricle   Mild to Moderate concentric left ventricular hypertrophy is present.   At least Moderate left ventricular apical hypertrophy is present.   Estimated left ventricular ejection fraction is 60-65 %.     Right Atrium   Normal appearing right atrium.     Right Ventricle   Normal appearing right ventricle structure and function.     Pericardial Effusion   No evidence of pericardial effusion.     Pleural Effusion   Pleural effusion - is present..     Miscellaneous   All visualized extra cardiac structures appears to be normal.     Impression     Summary     The left atrium is mildly dilated.   Mild to Moderate concentric left ventricular hypertrophy is present.   At least Moderate left ventricular apical hypertrophy is present.   Estimated left ventricular ejection fraction is 60-65 %.   Normal appearing right atrium.   Normal appearing right ventricle structure and function.   All visualized extra cardiac structures appears to be normal.   No evidence of pericardial effusion.   Pleural effusion - is present..   Fibrocalcific changes noted to the mitral valve leaflets with preserved   leaflet excursion.   Moderate (2+) mitral regurgitation is present.   Fibrocalcific changes noted to the Aortic valve leaflets with preserved   with minimally restricted leaflet excursion.   Trace aortic regurgitation is present.   Moderate (2+) tricuspid valve regurgitation is present.   Normal appearing pulmonic valve structure and function.     Signature     ----------------------------------------------------------------   Electronically signed by Jerrell Alcala MD(Interpreting   physician) on 2018 01:18 PM   ----------------------------------------------------------------    < end of copied text >
Patient is a 89y old  Male who presents with a chief complaint of cough, wheezing.   HPI:  89 y/o male with a PMHx of SVT s/p ablation 5 yrs ago at [place], hx right hip replacement  and revision, and chronic right hip pain on pain medication, hard of hearing presented to the ED c/o cough x2 months. Pt saw Dr. Posadas 3 weeks ago for an itchy throat, cough and sneezing, was given allergy medication. Recently, cough became productive with green phlegm and some wheezing associated  with B/l lower extremity edema. Pt went to  in [place] today and CXR showed fluid in his left lung and was sent to the ED for further evaluation.    In ED patient afebrile , found to have  B/L pleural effusions L>R , cannot exclude LLL and lingular PNA  On labs No leukocytosis,  BALDO BUN 28/cr 1.34, BNP 3500, troponin elevated upto 0.1    Cardiology team consulted for evaluation for CHF.    Pt states that he had been diuresing since presentation since diuretic was given to him.  He denies any CP or pressure or dizziness.  His daughter at bedside and she agreed with the history provided.    - pt seen and examined by me today. Pt denies any CP or SOB at rest.           Pshx- Left ankle surgery, R hip replacement and revision , tonsillectomy, radiofrequency cardio ablation for SVT 5 yrs ago at [facility]  social hx- denies etoh use, never smoker, denies recreational drug use  family hx- hx lung cancer brother, hx of colon cancer 2nd brother         MEDICATIONS  (STANDING):  aspirin enteric coated 81 milliGRAM(s) Oral daily  atorvastatin 10 milliGRAM(s) Oral at bedtime  azithromycin  IVPB 500 milliGRAM(s) IV Intermittent every 24 hours  cefTRIAXone Injectable. 1000 milliGRAM(s) IV Push every 24 hours  enoxaparin Injectable 30 milliGRAM(s) SubCutaneous every 24 hours  furosemide   Injectable 40 milliGRAM(s) IV Push daily  montelukast 10 milliGRAM(s) Oral daily  pantoprazole    Tablet 40 milliGRAM(s) Oral before breakfast        REVIEW OF SYSTEMS:  CONSTITUTIONAL:    No fatigue, malaise, lethargy.  No fever or chills.  HEENT:  Eyes:  No visual changes.     ENT:  No epistaxis.  No sinus pain.    RESPIRATORY:  c/o cough.  No wheeze.  No hemoptysis.  c/o shortness of breath.  CARDIOVASCULAR:  No chest pains.  No palpitations. No shortness of breath, No orthopnea or PND.  GASTROINTESTINAL:  No abdominal pain.  No nausea or vomiting.    GENITOURINARY:    No hematuria.    MUSCULOSKELETAL:  No musculoskeletal pain.  No joint swelling.  No arthritis.  NEUROLOGICAL:  No tingling or numbness or weakness.  PSYCHIATRIC:  No confusion  SKIN:  No rashes.    ENDOCRINE:  No unexplained weight loss.  No polydipsia.   HEMATOLOGIC:  No anemia.  No prolonged or excessive bleeding.   ALLERGIC AND IMMUNOLOGIC:  No pruritus.          Vital Signs Last 24 Hrs  T(C): 36.6 (12 Dec 2018 04:45), Max: 36.7 (11 Dec 2018 12:02)  T(F): 97.8 (12 Dec 2018 04:45), Max: 98 (11 Dec 2018 12:02)  HR: 89 (12 Dec 2018 04:45) (82 - 101)  BP: 136/82 (12 Dec 2018 04:45) (109/81 - 155/97)  BP(mean): --  RR: 18 (12 Dec 2018 04:45) (18 - 21)  SpO2: 95% (12 Dec 2018 04:45) (95% - 100%)    PHYSICAL EXAM-    Constitutional: no acute distress    Head: Head is normocephalic and atraumatic.      Neck: positive JVD.     Cardiovascular: Regular rate and rhythm without S3, S4. No murmurs or rubs are appreciated.      Respiratory: Breath sounds are decreased at bases.    Abdomen: Soft, nontender, nondistended with positive bowel sounds.      Extremity: No tenderness. No  pitting edema     Neurologic: The patient is alert and oriented.      Skin: No rash, no obvious lesions noted.      Psychiatric: The patient appears to be emotionally stable.      INTERPRETATION OF TELEMETRY: sinus rythm    ECG: Sinus rythm , poor R wave progression, PVCs.     I&O's Detail      LABS:                        13.9   9.63  )-----------( 268      ( 12 Dec 2018 05:51 )             42.6         144  |  112<H>  |  28<H>  ----------------------------<  92  4.8   |  24  |  1.34<H>    Ca    9.1      11 Dec 2018 11:08    TPro  7.3  /  Alb  3.5  /  TBili  0.6  /  DBili  x   /  AST  32  /  ALT  29  /  AlkPhos  89  12-11    CARDIAC MARKERS ( 11 Dec 2018 14:09 )  0.100 ng/mL / x     / x     / x     / x      CARDIAC MARKERS ( 11 Dec 2018 11:08 )  0.094 ng/mL / x     / x     / x     / x          PT/INR - ( 11 Dec 2018 11:08 )   PT: 12.3 sec;   INR: 1.10 ratio           Urinalysis Basic - ( 11 Dec 2018 14:46 )    Color: Yellow / Appearance: Clear / S.015 / pH: x  Gluc: x / Ketone: Negative  / Bili: Negative / Urobili: Negative mg/dL   Blood: x / Protein: 15 mg/dL / Nitrite: Negative   Leuk Esterase: Negative / RBC: Negative /HPF / WBC 0-2   Sq Epi: x / Non Sq Epi: x / Bacteria: x      I&O's Summary    BNPSerum Pro-Brain Natriuretic Peptide: 3539 pg/mL ( @ 11:08)    RADIOLOGY & ADDITIONAL STUDIES:  < from: CT Chest No Cont (18 @ 13:05) >    EXAM:  CT CHEST                            PROCEDURE DATE:  2018          INTERPRETATION:  Chest CT without contrast dated 2018.    COMPARISON: None available.    CLINICAL INFORMATION: Cough.    TECHNIQUE: Contiguous axial 2.5 mm slice thickness images of the chest   were obtained without intravenous contrast administration.    FINDINGS:    The airway shows normal caliber and contour with patent lumen.    There are bilateral pleural effusions, larger on the left. Near complete   atelectasis of the left lower lobe. The superior segment of the left   lower lobe is partially aerated. Partial atelectasis of the right lower   lobe and also atelectatic changes in the lingula. A 3 mm likely   atelectatic nodule in the superior right upper lobe, image #3/28 and a 3   mm nodule in the inferior right upper lobe on image #3/54. Interlobular   septal thickening is visualized in the upper and the aerated lower lobes,   likely indicative of mild pulmonary vascular congestion. Clinical   correlation is recommended. Underlying pneumonia in the lingula and the   left lower  lobe  can not be excluded. Clinical correlation is   recommended.    The mediastinum great vessels, ectasia and calcified plaques in the   thoracic aorta.  Cardiomegaly. There is no pericardial effusion  A limited evaluation of the upper abdomen, 3.3 cm cyst in the posterior   cortex of there right kidney. A 2.5 cm cyst in the posterior cortex of   the left kidney.    The bones , hypertrophic degenerative changes in the thoracic spine.    IMPRESSION:   Bilateral pleural effusions and atelectatic changes in the lower lobes   and the lingula as described. Cardiomegaly. Findings  are likely   indicative of congestive heart failure for which clinical correlation is   recommended.  Left lower lobe and lingular pneumonia cannot be excluded.                  NELIDA COKER M.D., ATTENDING RADIOLOGIST  This document has been electronically signed. Dec 11 2018  1:58PM                < end of copied text >
· Subjective and Objective: 	  89 y/o male with a PMHx of SVT s/p ablation 5 yrs ago at [place], hx right hip replacement  and revision, and chronic right hip pain on pain medication, hard of hearing presented to the ED c/o cough x2 months. Pt saw Dr. Posadas 3 weeks ago for an itchy throat, cough and sneezing, was given allergy medication. Recently, cough became productive with green phlegm and some wheezing associated  with B/l lower extremity edema. Pt went to  in [place] today and CXR showed fluid in his left lung and was sent to the ED for further evaluation.  ROS- patient  denies any chest pain or SOB ,denies abdominal pain or fever or chills, no dysuria   In ED patient afebrile , found to have  B/L pleural effusions L>R , cannot exclude LLL and lingular PNA  On labs No leukocytosis,  BALDO BUN 28/cr 1.34, BNP 3500, troponin elevated upto 0.1  EKG sinus rhthym with PVC 's old septal infarct unchanged from EKG done Oct 2018    12/13- denies SOB or chestpain, still has some productive cough  Constitutional: NAD  	HEENT: Atraumatic, SHIRLEY, Normal, No congestion  	Respiratory: ,improved air entry upper lung field, no wheezing  	Cardiovascular: N S1S2;  	Gastrointestinal: Abdomen soft, non tender, Bowel Ssounds present  	Extremities: 2+ B/l Lower extremity pitting edema  	Neurological: AAOriented to place, person, follows commands ,no gross focal motor deficits  	Back: No CVA tenderness   	Musculoskeletal: non tender  	Breasts: Deferred      PHYSICAL EXAM:    Daily     Daily Weight in k.8 (13 Dec 2018 06:33)    ICU Vital Signs Last 24 Hrs  T(C): 36.3 (13 Dec 2018 11:35), Max: 36.5 (12 Dec 2018 21:34)  T(F): 97.4 (13 Dec 2018 11:35), Max: 97.7 (12 Dec 2018 21:34)  HR: 80 (13 Dec 2018 11:35) (79 - 82)  BP: 105/67 (13 Dec 2018 11:35) (105/67 - 129/75)  BP(mean): --  ABP: --  ABP(mean): --  RR: 18 (13 Dec 2018 11:35) (18 - 18)  SpO2: 95% (13 Dec 2018 11:35) (92% - 100%)                                14.1   8.76  )-----------( 255      ( 13 Dec 2018 06:01 )             42.1       CBC Full  -  ( 13 Dec 2018 06:01 )  WBC Count : 8.76 K/uL  Hemoglobin : 14.1 g/dL  Hematocrit : 42.1 %  Platelet Count - Automated : 255 K/uL  Mean Cell Volume : 91.7 fl  Mean Cell Hemoglobin : 30.7 pg  Mean Cell Hemoglobin Concentration : 33.5 gm/dL  Auto Neutrophil # : 6.35 K/uL  Auto Lymphocyte # : 1.24 K/uL  Auto Monocyte # : 0.86 K/uL  Auto Eosinophil # : 0.25 K/uL  Auto Basophil # : 0.03 K/uL  Auto Neutrophil % : 72.5 %  Auto Lymphocyte % : 14.2 %  Auto Monocyte % : 9.8 %  Auto Eosinophil % : 2.9 %  Auto Basophil % : 0.3 %          140  |  105  |  39<H>  ----------------------------<  89  4.3   |  25  |  1.53<H>    Ca    8.7      13 Dec 2018 06:01                              MEDICATIONS  (STANDING):  aspirin enteric coated 81 milliGRAM(s) Oral daily  atorvastatin 10 milliGRAM(s) Oral at bedtime  azithromycin  IVPB 500 milliGRAM(s) IV Intermittent every 24 hours  cefTRIAXone Injectable. 1000 milliGRAM(s) IV Push every 24 hours  guaiFENesin  milliGRAM(s) Oral every 12 hours  heparin  Injectable 5000 Unit(s) SubCutaneous every 12 hours  montelukast 10 milliGRAM(s) Oral daily  pantoprazole    Tablet 40 milliGRAM(s) Oral before breakfast
89 y/o male with a PMHx of SVT s/p ablation 5 yrs ago at [place], hx right hip replacement  and revision, and chronic right hip pain on pain medication, hard of hearing presented to the ED c/o cough x2 months. Pt saw Dr. Posadas 3 weeks ago for an itchy throat, cough and sneezing, was given allergy medication. Recently, cough became productive with green phlegm and some wheezing associated  with B/l lower extremity edema. Pt went to  in [place] today and CXR showed fluid in his left lung and was sent to the ED for further evaluation.  ROS- patient  denies any chest pain or SOB ,denies abdominal pain or fever or chills, no dysuria   In ED patient afebrile , found to have  B/L pleural effusions L>R , cannot exclude LLL and lingular PNA  On labs No leukocytosis,  BALDO BUN 28/cr 1.34, BNP 3500, troponin elevated upto 0.1  EKG sinus rhthym with PVC 's old septal infarct unchanged from EKG done Oct 2018    12/12- denies SOB or chestpain, still has some productive cough  Constitutional: NAD  	HEENT: Atraumatic, SHIRLEY, Normal, No congestion  	Respiratory: decreased breath sound bilateral lung bases,improved air entry upper lung field, no wheezing  	Cardiovascular: N S1S2;  	Gastrointestinal: Abdomen soft, non tender, Bowel Ssounds present  	Extremities: 2+ B/l Lower extremity pitting edema  	Neurological: AAOriented to place, person, follows commands ,no gross focal motor deficits  	Back: No CVA tenderness   	Musculoskeletal: non tender  	Breasts: Deferred      PHYSICAL EXAM:    Daily     Daily Weight in k.8 (12 Dec 2018 04:45)    ICU Vital Signs Last 24 Hrs  T(C): 36.8 (12 Dec 2018 11:29), Max: 36.8 (12 Dec 2018 11:29)  T(F): 98.2 (12 Dec 2018 11:29), Max: 98.2 (12 Dec 2018 11:29)  HR: 87 (12 Dec 2018 11:29) (82 - 90)  BP: 122/73 (12 Dec 2018 11:29) (109/81 - 136/82)  BP(mean): --  ABP: --  ABP(mean): --  RR: 18 (12 Dec 2018 11:29) (18 - 21)  SpO2: 95% (12 Dec 2018 11:29) (95% - 97%)                                13.9   9.63  )-----------( 268      ( 12 Dec 2018 05:51 )             42.6       CBC Full  -  ( 12 Dec 2018 05:51 )  WBC Count : 9.63 K/uL  Hemoglobin : 13.9 g/dL  Hematocrit : 42.6 %  Platelet Count - Automated : 268 K/uL  Mean Cell Volume : 94.9 fl  Mean Cell Hemoglobin : 31.0 pg  Mean Cell Hemoglobin Concentration : 32.6 gm/dL  Auto Neutrophil # : 7.58 K/uL  Auto Lymphocyte # : 1.04 K/uL  Auto Monocyte # : 0.74 K/uL  Auto Eosinophil # : 0.19 K/uL  Auto Basophil # : 0.04 K/uL  Auto Neutrophil % : 78.7 %  Auto Lymphocyte % : 10.8 %  Auto Monocyte % : 7.7 %  Auto Eosinophil % : 2.0 %  Auto Basophil % : 0.4 %          141  |  107  |  29<H>  ----------------------------<  88  4.9   |  26  |  1.37<H>    Ca    8.9      12 Dec 2018 05:51    TPro  7.3  /  Alb  3.5  /  TBili  0.6  /  DBili  x   /  AST  32  /  ALT  29  /  AlkPhos  89  12-11      LIVER FUNCTIONS - ( 11 Dec 2018 11:08 )  Alb: 3.5 g/dL / Pro: 7.3 gm/dL / ALK PHOS: 89 U/L / ALT: 29 U/L / AST: 32 U/L / GGT: x             PT/INR - ( 11 Dec 2018 11:08 )   PT: 12.3 sec;   INR: 1.10 ratio             CARDIAC MARKERS ( 11 Dec 2018 14:09 )  0.100 ng/mL / x     / x     / x     / x      CARDIAC MARKERS ( 11 Dec 2018 11:08 )  0.094 ng/mL / x     / x     / x     / x            Urinalysis Basic - ( 11 Dec 2018 14:46 )    Color: Yellow / Appearance: Clear / S.015 / pH: x  Gluc: x / Ketone: Negative  / Bili: Negative / Urobili: Negative mg/dL   Blood: x / Protein: 15 mg/dL / Nitrite: Negative   Leuk Esterase: Negative / RBC: Negative /HPF / WBC 0-2   Sq Epi: x / Non Sq Epi: x / Bacteria: x            MEDICATIONS  (STANDING):  aspirin enteric coated 81 milliGRAM(s) Oral daily  atorvastatin 10 milliGRAM(s) Oral at bedtime  azithromycin  IVPB 500 milliGRAM(s) IV Intermittent every 24 hours  cefTRIAXone Injectable. 1000 milliGRAM(s) IV Push every 24 hours  furosemide   Injectable 40 milliGRAM(s) IV Push daily  guaiFENesin  milliGRAM(s) Oral every 12 hours  heparin  Injectable 5000 Unit(s) SubCutaneous every 12 hours  montelukast 10 milliGRAM(s) Oral daily  pantoprazole    Tablet 40 milliGRAM(s) Oral before breakfast

## 2018-12-14 NOTE — DISCHARGE NOTE ADULT - PLAN OF CARE
Start lasix 20mg daily on 12/16, follow up with PCP or cardio in 1 week for BMP check(blood test to check kidney function),  and Dr desai also for evaluation for placements of Cardiomems, continue asa, statin see discharge summary complete cefuroxime and zithromax antibiotics as prescribed , follow up with dr zhu in 1 week and for repeat CXR as outpatient to check improvement in pleural effusion

## 2018-12-14 NOTE — PROGRESS NOTE ADULT - REASON FOR ADMISSION
cough, wheezing

## 2018-12-14 NOTE — DISCHARGE NOTE ADULT - MEDICATION SUMMARY - MEDICATIONS TO TAKE
I will START or STAY ON the medications listed below when I get home from the hospital:    aspirin 81 mg oral tablet  -- 1 tab(s) by mouth once a day  -- Indication: For .    pravastatin 20 mg oral tablet  -- 1 tab(s) by mouth once a day  -- Indication: For .    cefuroxime 500 mg oral tablet  -- 1 tab(s) by mouth every 12 hours  -- Indication: For CAP    Lasix 20 mg oral tablet  -- 1 tab(s) by mouth once a day MDD:start 12/16/18  -- Indication: For CHF (congestive heart failure)    montelukast 10 mg oral tablet  -- 1 tab(s) by mouth once a day  -- Indication: For .    azithromycin 250 mg oral tablet  -- 1 tab(s) by mouth once a day  -- Indication: For CAP    fluticasone 50 mcg/inh nasal spray  -- 1 spray(s) into nose once a day  -- Indication: For .    omeprazole 20 mg oral delayed release capsule  -- 1 cap(s) by mouth once a day  -- Indication: For .    multivitamin  -- Indication: For .    Vitamin D3 1000 intl units oral tablet  -- 1 tab(s) by mouth once a day  -- Indication: For .

## 2018-12-14 NOTE — DISCHARGE NOTE ADULT - PATIENT PORTAL LINK FT
You can access the [facility] Patient Portal, offered by [facility], by registering with the following website: [email or web address]

## 2018-12-14 NOTE — PROGRESS NOTE ADULT - ASSESSMENT
Acute/Chr CHF.  Cardiology following.     Bilat L greater than R pleural effusions. Some loculation on L.  For CXR in 1-2 days.    Treat for CAP. IV Abx's.  NC O2.  Mucinex.  Continue Montelukast.  Incentive spirometry.
Acute/Chr CHF.  Cardiology following.     Bilat L greater than R pleural effusions. Some loculation on L.  For following CXR.    Treat for CAP. Abx's.  Continue Montelukast.  Incentive spirometry.
SOB and cough - acute on chronic decompensated HF- unknown LVEF- hypervolemic, NYHA class III-   Continue diuresis with iv lasix.  AM labs pending - asses renal function and electrolytes.   Diuresis with close monitoring of the renal function and electrolytes.  Goal potassium of 4 and magnesium of 2.   Strict I/O and daily wt checks. Low sodium diet. Nutrition education.   check 2 D echo- pending  Pt does not know if he had ischemic workup in past.  Had SVT ablation in past per family.  no  arrythmias on tele so far.   Check TFTs.    Hyperlipidemia- continue statin.    Discussed clinical mange ment plan with daughter at length.     Other medical issues- Management per primary team.   Thank you for allowing me to participate in the care of this patient. Please feel free to contact me with any questions.
SOB and cough - acute on chronic decompensated HFpEF-normal LVEF- hypervolemic, NYHA class III-   hold further diuretics for now.  Lasix 20mg po daily to be started in 2 days after discharge.  renal function improving.  Needs monitoring for now.  close monitoring of the renal function and electrolytes.  Goal potassium of 4 and magnesium of 2.   Strict I/O and daily wt checks. Low sodium diet. Nutrition education.   echo results noted.   Pt does not know if he had ischemic workup in past.  Had SVT ablation in past per family.  no  arrythmias on tele so far.   he is an ideal candidate for implantable pulmonary artery pressure montior for preventing further hospitalizations and better monitoring of the volume status as outpt.  Will discuss with family and if they are agreeable - will arrange as outpt.     Hyperlipidemia- continue statin.    Discussed clinical management plan with pt at length.     Other medical issues- Management per primary team.   Thank you for allowing me to participate in the care of this patient. Please feel free to contact me with any questions.
SOB and cough - acute on chronic decompensated HFpEF-normal LVEF- hypervolemic, NYHA class III-   hold further diuretics.  renal function slightly worse today.  Needs monitoring for now.  close monitoring of the renal function and electrolytes.  Goal potassium of 4 and magnesium of 2.   Strict I/O and daily wt checks. Low sodium diet. Nutrition education.   echo results noted.   Pt does not know if he had ischemic workup in past.  Had SVT ablation in past per family.  no  arrythmias on tele so far.   he is an ideal candidate for implantable pulmonary artery pressure montior for preventing further hospitalizations and better monitoring of the volume status as outpt.  Will discuss with family and if they are agreeable - will arrange as outpt.       Hyperlipidemia- continue statin.    Discussed clinical mangement plan with pt at length.     Other medical issues- Management per primary team.   Thank you for allowing me to participate in the care of this patient. Please feel free to contact me with any questions.
· Assessment		  89 y/o male with a PMHx of SVT s/p ablation 5 yrs ago at [place], hx right hip replacement  and revision, and chronic right hip pain on pain medication, hard of hearing presented to the ED c/o cough x2 months. Pt saw Dr. Posadas 3 weeks ago for an itchy throat, cough and sneezing, was given allergy medication. Recently, cough became productive with green phlegm and some wheezing associated  with B/l lower extremity edema. Pt went to  in [place] today and CXR showed fluid in his left lung and was sent to the ED for further evaluation.  ROS- patient  denies any chest pain or SOB ,denies abdominal pain or fever or chills, no dysuria   In ED patient afebrile , found to have  B/L pleural effusions L>R , cannot exclude LLL and lingular PNAOn labs No leukocytosis,  BALDO BUN 28/cr 1.34, BNP 3500, troponin elevated upto 0.1  EKG sinus rhthym with PVC 's old septal infarct unchanged from EKG done Oct 2018    A/P  #Acute Heart failure-clinically compensated  / /no Prior hx of CHF  - Admit to tele    continue asa, statin  hold lasix for now  -will hold off ACEI for now due to BALDO  -Monitor I/O, Daily weights  -Echo  -serial cardiac enzymes  -Lipid and TSH in am  -Cardiology consult    #Suspect Left lower lobe and lingular community acquired Pneumonia/ suspect GNR and atypical   - IV ceftriaxone and IV zithromax  check CXr in am  mucinex  -Blood cx  -urine legionella and mycoplasma antibodies    #BALDO likely prerenal azotemia from acute heart failure  /No  prior labs to compare with- continue IV lasix for now  and monitor BMP    #Elevated troponins  with no significant new acute EKG changes ,no chest pain - Likely demand ischemia  -monitor cardiac enzymes and continue tele monitoring #DVT prophylaxis - Lovenox SC  IMPROVE VTE Individual Risk AssessmentRISK                                                                Points    [  ] Previous VTE                                                  3    [  ] Thrombophilia                                               2    [  ] Lower limb paralysis                                      2        (unable to hold up >15 seconds)      [  ] Current Cancer                                              2         (within 6 months)    [  ] Immobilization > 24 hrs                                1    [  ] ICU/CCU stay > 24 hours                              1    [  ] Age > 60                                                      1    IMPROVE VTE Score _____2____
· Assessment		  89 y/o male with a PMHx of SVT s/p ablation 5 yrs ago at [place], hx right hip replacement  and revision, and chronic right hip pain on pain medication, hard of hearing presented to the ED c/o cough x2 months. Pt saw Dr. Posadas 3 weeks ago for an itchy throat, cough and sneezing, was given allergy medication. Recently, cough became productive with green phlegm and some wheezing associated  with B/l lower extremity edema. Pt went to  in [place] today and CXR showed fluid in his left lung and was sent to the ED for further evaluation.  ROS- patient  denies any chest pain or SOB ,denies abdominal pain or fever or chills, no dysuria   In ED patient afebrile , found to have  B/L pleural effusions L>R , cannot exclude LLL and lingular PNA  On labs No leukocytosis,  BALDO BUN 28/cr 1.34, BNP 3500, troponin elevated upto 0.1  EKG sinus rhthym with PVC 's old septal infarct unchanged from EKG done Oct 2018    A/P  #Acute Heart failure / EF unknown /no Prior hx of CHF  - Admit to tele  - continue IV lasix   continue asa, statin  -will hold off ACEI for now due to BALDO  -Monitor I/O, Daily weights  -Echo  -serial cardiac enzymes  -Lipid and TSH in am  -Cardiology consult    #Suspect Left lower lobe and lingular community acquired Pneumonia/ suspect GNR and atypical   - IV ceftriaxone and IV zithromax  mucinex  -Blood cx  -urine legionella and mycoplasma antibodies    #BALDO likely prerenal azotemia from acute heart failure  /No  prior labs to compare with- continue IV lasix for now  and monitor BMP    #Elevated troponins  with no significant new acute EKG changes ,no chest pain - Likely demand ischemia  -monitor cardiac enzymes and continue tele monitoring     #DVT prophylaxis - Lovenox SC  IMPROVE VTE Individual Risk AssessmentRISK                                                                Points    [  ] Previous VTE                                                  3    [  ] Thrombophilia                                               2    [  ] Lower limb paralysis                                      2        (unable to hold up >15 seconds)      [  ] Current Cancer                                              2         (within 6 months)    [  ] Immobilization > 24 hrs                                1    [  ] ICU/CCU stay > 24 hours                              1    [  ] Age > 60                                                      1    IMPROVE VTE Score _____2____

## 2018-12-14 NOTE — DISCHARGE NOTE ADULT - MEDICATION SUMMARY - MEDICATIONS TO STOP TAKING
I will STOP taking the medications listed below when I get home from the hospital:    meloxicam 7.5 mg oral tablet  -- 1 tab(s) by mouth once a day

## 2018-12-14 NOTE — DISCHARGE NOTE ADULT - CARE PLAN
Principal Discharge DX:	Other congestive heart failure  Goal:	Start lasix 20mg daily on 12/16, follow up with PCP or cardio in 1 week for BMP check(blood test to check kidney function),  and Dr desai also for evaluation for placements of Cardiomems, continue asa, statin  Assessment and plan of treatment:	see discharge summary  Secondary Diagnosis:	Pneumonia  Goal:	complete cefuroxime and zithromax antibiotics as prescribed , follow up with dr zhu in 1 week and for repeat CXR as outpatient to check improvement in pleural effusion

## 2018-12-14 NOTE — DISCHARGE NOTE ADULT - CARE PROVIDERS DIRECT ADDRESSES
[email or web address]

## 2018-12-14 NOTE — DISCHARGE NOTE ADULT - CARE PROVIDER_API CALL
Tammi Posadas), Internal Medicine  [address] [place] [address]  Phone: [phone]  Fax: [phone]    Telly Hutchinson (MD), Internal Medicine; Pulmonary Disease  [address] [place] [address]  Phone: [phone]  Fax: [phone]    Radha New), Cardiovascular Disease; Internal Medicine  [address] [place] [address]  Phone: [phone]  Fax: [phone]

## 2018-12-16 LAB
CULTURE RESULTS: SIGNIFICANT CHANGE UP
CULTURE RESULTS: SIGNIFICANT CHANGE UP
SPECIMEN SOURCE: SIGNIFICANT CHANGE UP
SPECIMEN SOURCE: SIGNIFICANT CHANGE UP

## 2018-12-17 PROBLEM — H91.90 UNSPECIFIED HEARING LOSS, UNSPECIFIED EAR: Chronic | Status: ACTIVE | Noted: 2018-12-11

## 2018-12-18 DIAGNOSIS — Z79.82 LONG TERM (CURRENT) USE OF ASPIRIN: ICD-10-CM

## 2018-12-18 DIAGNOSIS — I50.9 HEART FAILURE, UNSPECIFIED: ICD-10-CM

## 2018-12-18 DIAGNOSIS — E78.5 HYPERLIPIDEMIA, UNSPECIFIED: ICD-10-CM

## 2018-12-18 DIAGNOSIS — J15.6 PNEUMONIA DUE TO OTHER GRAM-NEGATIVE BACTERIA: ICD-10-CM

## 2018-12-18 DIAGNOSIS — Z79.899 OTHER LONG TERM (CURRENT) DRUG THERAPY: ICD-10-CM

## 2018-12-18 DIAGNOSIS — I50.33 ACUTE ON CHRONIC DIASTOLIC (CONGESTIVE) HEART FAILURE: ICD-10-CM

## 2018-12-18 DIAGNOSIS — I24.8 OTHER FORMS OF ACUTE ISCHEMIC HEART DISEASE: ICD-10-CM

## 2019-01-02 ENCOUNTER — APPOINTMENT (OUTPATIENT)
Dept: INTERNAL MEDICINE | Facility: CLINIC | Age: 84
End: 2019-01-02
Payer: MEDICARE

## 2019-01-02 VITALS
SYSTOLIC BLOOD PRESSURE: 110 MMHG | RESPIRATION RATE: 18 BRPM | TEMPERATURE: 97.5 F | OXYGEN SATURATION: 99 % | WEIGHT: 141 LBS | DIASTOLIC BLOOD PRESSURE: 70 MMHG | HEART RATE: 104 BPM | HEIGHT: 63 IN | BODY MASS INDEX: 24.98 KG/M2

## 2019-01-02 PROCEDURE — 94729 DIFFUSING CAPACITY: CPT

## 2019-01-02 PROCEDURE — ZZZZZ: CPT

## 2019-01-02 PROCEDURE — 94727 GAS DIL/WSHOT DETER LNG VOL: CPT

## 2019-01-02 PROCEDURE — G0442 ANNUAL ALCOHOL SCREEN 15 MIN: CPT

## 2019-01-02 PROCEDURE — 94060 EVALUATION OF WHEEZING: CPT

## 2019-01-02 PROCEDURE — 99495 TRANSJ CARE MGMT MOD F2F 14D: CPT | Mod: 25

## 2019-01-02 NOTE — HISTORY OF PRESENT ILLNESS
[Post-hospitalization from ___ Hospital] : Post-hospitalization from [unfilled] Hospital [Discharged on ___] : discharged on [unfilled] [FreeTextEntry2] : Is a pleasant 89-year-old man was recently in the hospital, at [facility] for left greater than right pleural effusions. These were felt to be secondary to acute on chronic CHF. He was also treated with antibiotics for pneumonia. Has been doing well at home. He is not noting dyspnea on exertion. He walks 60 feet okay today. He is not having coughing or wheezing.\par \par He denies recent chest pain, palpitations, lightheadedness, or syncope. His weight is stable at home at about 137 pounds without clothes on.\par \par He is not having any fevers or chills.

## 2019-01-02 NOTE — PHYSICAL EXAM
[No Acute Distress] : no acute distress [Well Nourished] : well nourished [Well Developed] : well developed [Well-Appearing] : well-appearing [Normal Sclera/Conjunctiva] : normal sclera/conjunctiva [PERRL] : pupils equal round and reactive to light [Normal Outer Ear/Nose] : the outer ears and nose were normal in appearance [Normal Oropharynx] : the oropharynx was normal [No JVD] : no jugular venous distention [Supple] : supple [No Lymphadenopathy] : no lymphadenopathy [No Respiratory Distress] : no respiratory distress  [Clear to Auscultation] : lungs were clear to auscultation bilaterally [No Accessory Muscle Use] : no accessory muscle use [Normal Rate] : normal rate  [Regular Rhythm] : with a regular rhythm [Normal S1, S2] : normal S1 and S2 [No Extremity Clubbing/Cyanosis] : no extremity clubbing/cyanosis [Soft] : abdomen soft [Non Tender] : non-tender [Non-distended] : non-distended [No HSM] : no HSM [Normal Bowel Sounds] : normal bowel sounds [Normal Anterior Cervical Nodes] : no anterior cervical lymphadenopathy [No Rash] : no rash [No Focal Deficits] : no focal deficits [Normal Affect] : the affect was normal [Normal Insight/Judgement] : insight and judgment were intact [de-identified] : BS's aud L and R bases.  rare crackles L base. [de-identified] : Tr LE edema

## 2019-01-02 NOTE — HEALTH RISK ASSESSMENT
[Patient reported colonoscopy was normal] : Patient reported colonoscopy was normal [] : No [Retired] : retired [Smoke Detector] : smoke detector [Carbon Monoxide Detector] : carbon monoxide detector [Guns at Home] : no guns at home [Safety elements used in home] : safety elements used in home [Seat Belt] :  uses seat belt [Sunscreen] : uses sunscreen [ColonoscopyDate] : 01/12

## 2019-01-02 NOTE — REVIEW OF SYSTEMS
[Fever] : no fever [Chills] : no chills [Chest Pain] : no chest pain [Palpitations] : no palpitations [Wheezing] : no wheezing [Cough] : no cough [Dyspnea on Exertion] : not dyspnea on exertion [Dizziness] : no dizziness [Fainting] : no fainting [Negative] : Heme/Lymph

## 2019-01-02 NOTE — HISTORY OF PRESENT ILLNESS
[Post-hospitalization from ___ Hospital] : Post-hospitalization from [unfilled] Hospital [Discharged on ___] : discharged on [unfilled] [FreeTextEntry2] : Is a pleasant 89-year-old man was recently in the hospital, at [facility] for left greater than right pleural effusions. These were felt to be secondary to acute on chronic CHF. He was also treated with antibiotics for pneumonia. Has been doing well at home. He is not noting dyspnea on exertion. He walks 60 feet okay today. He is not having coughing or wheezing.\par \par He denies recent chest pain, palpitations, lightheadedness, or syncope. His weight is stable at home at about 137 pounds without clothes on.\par \par He is not having any fevers or chills.

## 2019-01-02 NOTE — PHYSICAL EXAM
[No Acute Distress] : no acute distress [Well Nourished] : well nourished [Well Developed] : well developed [Well-Appearing] : well-appearing [Normal Sclera/Conjunctiva] : normal sclera/conjunctiva [PERRL] : pupils equal round and reactive to light [Normal Outer Ear/Nose] : the outer ears and nose were normal in appearance [Normal Oropharynx] : the oropharynx was normal [No JVD] : no jugular venous distention [Supple] : supple [No Lymphadenopathy] : no lymphadenopathy [No Respiratory Distress] : no respiratory distress  [Clear to Auscultation] : lungs were clear to auscultation bilaterally [No Accessory Muscle Use] : no accessory muscle use [Normal Rate] : normal rate  [Regular Rhythm] : with a regular rhythm [Normal S1, S2] : normal S1 and S2 [No Extremity Clubbing/Cyanosis] : no extremity clubbing/cyanosis [Soft] : abdomen soft [Non Tender] : non-tender [Non-distended] : non-distended [No HSM] : no HSM [Normal Bowel Sounds] : normal bowel sounds [Normal Anterior Cervical Nodes] : no anterior cervical lymphadenopathy [No Rash] : no rash [No Focal Deficits] : no focal deficits [Normal Affect] : the affect was normal [Normal Insight/Judgement] : insight and judgment were intact [de-identified] : BS's aud L and R bases.  rare crackles L base. [de-identified] : Tr LE edema

## 2019-01-02 NOTE — COUNSELING
[Healthy eating counseling provided] : healthy eating [Low Fat Diet] : Low fat diet [Low Salt Diet] : Low salt diet [ - Annual Alcohol Misuse Screening] : Annual Alcohol Misuse Screening

## 2019-01-02 NOTE — ASSESSMENT
[FreeTextEntry1] : #1 status post hospitalizations for left greater than right pleural effusions, secondary to acute on chronic CHF. Patient was also treated for pneumonia with antibiotic course.  He is doing well clinically. Weight is stable at home.\par \par #2 HPL.\par \par #3 h.o. acute on chronic CHF.\par \par RV 6 months.

## 2019-01-04 RX ORDER — FLUTICASONE PROPIONATE 50 MCG
1 SPRAY, SUSPENSION NASAL
Qty: 0 | Refills: 0 | COMMUNITY

## 2019-01-04 RX ORDER — MONTELUKAST 4 MG/1
1 TABLET, CHEWABLE ORAL
Qty: 0 | Refills: 0 | COMMUNITY

## 2019-01-04 NOTE — H&P ADULT - PMH
Heart failure, diastolic    HLD (hyperlipidemia)    [place] (hard of hearing)    SVT (supraventricular tachycardia)

## 2019-01-04 NOTE — H&P ADULT - NSHPREVIEWOFSYSTEMS_GEN_ALL_CORE
General: Pt denies recent weight loss/fever/chills    Neurological: denies numbness or  sensation loss    Cardiovascular: denies chest pain/palpitations/leg edema    Respiratory and Thorax: denies SOB/cough/wheezing    Gastrointestinal: denies abdominal pain/diarrhea/constipation/bloody stool    Genitourinary: denies urinary frequency/urgency/ dysuria    Musculoskeletal: denies joint pain or swelling, denies restricted motion    Hematologic: denies abnormal bleeding General: Pt denies recent weight loss/fever/chills    Neurological: denies numbness or  sensation loss    Cardiovascular: denies chest pain/palpitations/leg edema    Respiratory and Thorax: + SOB, denies cough/wheezing    Gastrointestinal: denies abdominal pain/diarrhea/constipation/bloody stool    Genitourinary: denies urinary frequency/urgency/ dysuria    Musculoskeletal: denies joint pain or swelling, denies restricted motion    Hematologic: denies abnormal bleeding

## 2019-01-04 NOTE — H&P ADULT - ASSESSMENT
89 y.o male with PMHx of HLD, SVT, s/p ablation, chronic diastolic heart failure referred for implantable pulmonary artery monitor for further monitoring of volume status and preventing further hospitalization.     # chronic diastolic heart failure  - plan for implantable pulmonary artery monitor  - risks and benefits of procedure reviewed, all questions answered   - informed consent obtained, pt verbalized understanding.

## 2019-01-04 NOTE — H&P ADULT - HISTORY OF PRESENT ILLNESS
89 y.o male with PMHx of HLD, SVT, s/p ablation, chronic diastolic heart failure referred for implantable pulmonary artery monitor for further monitoring of volume status and preventing further hospitalization.

## 2019-01-04 NOTE — H&P ADULT - NSHPLABSRESULTS_GEN_ALL_CORE
TTE (12/12/18): EF: 60-65%, mild- mod concentric LVH, mod MR/ TR   EKG (12/17/18): SR at 83 bpm, non-specific T wave abnormality

## 2019-01-04 NOTE — H&P ADULT - NSHPPHYSICALEXAM_GEN_ALL_CORE
Vital Signs : BP        HR       RR        BT             Constitutional: well developed, well nourished, no deformities and no acute distress    Neurological: Alert & Oriented x 3, OLSON, no focal deficits    HEENT: NC/AT, PERRLA, EOMI,  Neck supple.    Respiratory: CTA B/L, No wheezing/crackles/rhonchi    Cardiovascular: (+) S1 & S2, RRR, No m/r/g    Gastrointestinal: soft, NT, nondistended, (+) BS    Genitourinary: non distended bladder, voiding freely    Extremities: No pedal edema, No clubbing, No cyanosis    Skin:  normal skin color and pigmentation, no skin lesions

## 2019-01-07 ENCOUNTER — OUTPATIENT (OUTPATIENT)
Dept: OUTPATIENT SERVICES | Facility: HOSPITAL | Age: 84
LOS: 1 days | Discharge: HOME | End: 2019-01-07

## 2019-01-07 VITALS
WEIGHT: 136.03 LBS | RESPIRATION RATE: 16 BRPM | HEIGHT: 62 IN | OXYGEN SATURATION: 99 % | TEMPERATURE: 99 F | DIASTOLIC BLOOD PRESSURE: 83 MMHG | HEART RATE: 84 BPM | SYSTOLIC BLOOD PRESSURE: 128 MMHG

## 2019-01-07 DIAGNOSIS — Z98.890 OTHER SPECIFIED POSTPROCEDURAL STATES: Chronic | ICD-10-CM

## 2019-01-07 LAB
ANION GAP SERPL CALC-SCNC: 8 MMOL/L — SIGNIFICANT CHANGE UP (ref 5–17)
BUN SERPL-MCNC: 36 MG/DL — HIGH (ref 7–23)
CALCIUM SERPL-MCNC: 9.3 MG/DL — SIGNIFICANT CHANGE UP (ref 8.5–10.1)
CHLORIDE SERPL-SCNC: 105 MMOL/L — SIGNIFICANT CHANGE UP (ref 96–108)
CO2 SERPL-SCNC: 26 MMOL/L — SIGNIFICANT CHANGE UP (ref 22–31)
CREAT SERPL-MCNC: 1.58 MG/DL — HIGH (ref 0.5–1.3)
GLUCOSE SERPL-MCNC: 90 MG/DL — SIGNIFICANT CHANGE UP (ref 70–99)
POTASSIUM SERPL-MCNC: 4.1 MMOL/L — SIGNIFICANT CHANGE UP (ref 3.5–5.3)
POTASSIUM SERPL-SCNC: 4.1 MMOL/L — SIGNIFICANT CHANGE UP (ref 3.5–5.3)
SODIUM SERPL-SCNC: 139 MMOL/L — SIGNIFICANT CHANGE UP (ref 135–145)

## 2019-01-07 RX ORDER — CLOPIDOGREL BISULFATE 75 MG/1
150 TABLET, FILM COATED ORAL ONCE
Qty: 0 | Refills: 0 | Status: COMPLETED | OUTPATIENT
Start: 2019-01-07 | End: 2019-01-07

## 2019-01-07 RX ORDER — CLOPIDOGREL BISULFATE 75 MG/1
1 TABLET, FILM COATED ORAL
Qty: 30 | Refills: 0 | OUTPATIENT
Start: 2019-01-07 | End: 2019-02-05

## 2019-01-07 RX ORDER — CLOPIDOGREL BISULFATE 75 MG/1
75 TABLET, FILM COATED ORAL ONCE
Qty: 0 | Refills: 0 | Status: DISCONTINUED | OUTPATIENT
Start: 2019-01-07 | End: 2019-01-07

## 2019-01-07 RX ORDER — PANTOPRAZOLE SODIUM 20 MG/1
40 TABLET, DELAYED RELEASE ORAL
Qty: 0 | Refills: 0 | Status: DISCONTINUED | OUTPATIENT
Start: 2019-01-08 | End: 2019-01-08

## 2019-01-07 RX ORDER — ATORVASTATIN CALCIUM 80 MG/1
10 TABLET, FILM COATED ORAL AT BEDTIME
Qty: 0 | Refills: 0 | Status: DISCONTINUED | OUTPATIENT
Start: 2019-01-07 | End: 2019-01-08

## 2019-01-07 RX ORDER — CLOPIDOGREL BISULFATE 75 MG/1
75 TABLET, FILM COATED ORAL DAILY
Qty: 0 | Refills: 0 | Status: DISCONTINUED | OUTPATIENT
Start: 2019-01-08 | End: 2019-01-08

## 2019-01-07 RX ORDER — ASPIRIN/CALCIUM CARB/MAGNESIUM 324 MG
81 TABLET ORAL ONCE
Qty: 0 | Refills: 0 | Status: COMPLETED | OUTPATIENT
Start: 2019-01-07 | End: 2019-01-07

## 2019-01-07 RX ORDER — ASPIRIN/CALCIUM CARB/MAGNESIUM 324 MG
81 TABLET ORAL DAILY
Qty: 0 | Refills: 0 | Status: DISCONTINUED | OUTPATIENT
Start: 2019-01-08 | End: 2019-01-08

## 2019-01-07 RX ORDER — FUROSEMIDE 40 MG
20 TABLET ORAL DAILY
Qty: 0 | Refills: 0 | Status: DISCONTINUED | OUTPATIENT
Start: 2019-01-07 | End: 2019-01-08

## 2019-01-07 RX ADMIN — CLOPIDOGREL BISULFATE 150 MILLIGRAM(S): 75 TABLET, FILM COATED ORAL at 12:22

## 2019-01-07 RX ADMIN — Medication 81 MILLIGRAM(S): at 12:22

## 2019-01-07 RX ADMIN — ATORVASTATIN CALCIUM 10 MILLIGRAM(S): 80 TABLET, FILM COATED ORAL at 22:10

## 2019-01-07 NOTE — CHART NOTE - NSCHARTNOTEFT_GEN_A_CORE
Pt was coughing up small amount of fresh blood x1 this pm.    VSS, denies SOB, Dizziness or pain.   - cancel discharge home today  - admit to tele unit for further monitoring  - will continue to monitor Pt was coughing up small amount of fresh blood x1 this pm.    VSS, denies SOB, Dizziness or pain.   - cancel discharge home today  - admit to tele unit for further monitoring  - repeat CBC in am   - will continue to monitor

## 2019-01-08 ENCOUNTER — TRANSCRIPTION ENCOUNTER (OUTPATIENT)
Age: 84
End: 2019-01-08

## 2019-01-08 VITALS
RESPIRATION RATE: 26 BRPM | HEART RATE: 95 BPM | OXYGEN SATURATION: 85 % | DIASTOLIC BLOOD PRESSURE: 65 MMHG | SYSTOLIC BLOOD PRESSURE: 122 MMHG

## 2019-01-08 DIAGNOSIS — Z79.82 LONG TERM (CURRENT) USE OF ASPIRIN: ICD-10-CM

## 2019-01-08 DIAGNOSIS — E78.5 HYPERLIPIDEMIA, UNSPECIFIED: ICD-10-CM

## 2019-01-08 DIAGNOSIS — Z79.1 LONG TERM (CURRENT) USE OF NON-STEROIDAL ANTI-INFLAMMATORIES (NSAID): ICD-10-CM

## 2019-01-08 DIAGNOSIS — I50.22 CHRONIC SYSTOLIC (CONGESTIVE) HEART FAILURE: ICD-10-CM

## 2019-01-08 DIAGNOSIS — I47.1 SUPRAVENTRICULAR TACHYCARDIA: ICD-10-CM

## 2019-01-08 DIAGNOSIS — R06.09 OTHER FORMS OF DYSPNEA: ICD-10-CM

## 2019-01-08 LAB
ANION GAP SERPL CALC-SCNC: 7 MMOL/L — SIGNIFICANT CHANGE UP (ref 5–17)
BUN SERPL-MCNC: 37 MG/DL — HIGH (ref 7–23)
CALCIUM SERPL-MCNC: 9 MG/DL — SIGNIFICANT CHANGE UP (ref 8.5–10.1)
CHLORIDE SERPL-SCNC: 105 MMOL/L — SIGNIFICANT CHANGE UP (ref 96–108)
CO2 SERPL-SCNC: 27 MMOL/L — SIGNIFICANT CHANGE UP (ref 22–31)
CREAT SERPL-MCNC: 1.4 MG/DL — HIGH (ref 0.5–1.3)
GLUCOSE SERPL-MCNC: 88 MG/DL — SIGNIFICANT CHANGE UP (ref 70–99)
HCT VFR BLD CALC: 42.3 % — SIGNIFICANT CHANGE UP (ref 39–50)
HGB BLD-MCNC: 14 G/DL — SIGNIFICANT CHANGE UP (ref 13–17)
MCHC RBC-ENTMCNC: 31.3 PG — SIGNIFICANT CHANGE UP (ref 27–34)
MCHC RBC-ENTMCNC: 33.1 GM/DL — SIGNIFICANT CHANGE UP (ref 32–36)
MCV RBC AUTO: 94.6 FL — SIGNIFICANT CHANGE UP (ref 80–100)
NRBC # BLD: 0 /100 WBCS — SIGNIFICANT CHANGE UP (ref 0–0)
PLATELET # BLD AUTO: 304 K/UL — SIGNIFICANT CHANGE UP (ref 150–400)
POTASSIUM SERPL-MCNC: 4.8 MMOL/L — SIGNIFICANT CHANGE UP (ref 3.5–5.3)
POTASSIUM SERPL-SCNC: 4.8 MMOL/L — SIGNIFICANT CHANGE UP (ref 3.5–5.3)
RBC # BLD: 4.47 M/UL — SIGNIFICANT CHANGE UP (ref 4.2–5.8)
RBC # FLD: 13.2 % — SIGNIFICANT CHANGE UP (ref 10.3–14.5)
SODIUM SERPL-SCNC: 139 MMOL/L — SIGNIFICANT CHANGE UP (ref 135–145)
WBC # BLD: 7.41 K/UL — SIGNIFICANT CHANGE UP (ref 3.8–10.5)
WBC # FLD AUTO: 7.41 K/UL — SIGNIFICANT CHANGE UP (ref 3.8–10.5)

## 2019-01-08 RX ORDER — CLOPIDOGREL BISULFATE 75 MG/1
1 TABLET, FILM COATED ORAL
Qty: 30 | Refills: 0 | OUTPATIENT
Start: 2019-01-08 | End: 2019-02-06

## 2019-01-08 RX ADMIN — CLOPIDOGREL BISULFATE 75 MILLIGRAM(S): 75 TABLET, FILM COATED ORAL at 10:45

## 2019-01-08 RX ADMIN — Medication 20 MILLIGRAM(S): at 06:15

## 2019-01-08 RX ADMIN — Medication 81 MILLIGRAM(S): at 10:45

## 2019-01-08 RX ADMIN — PANTOPRAZOLE SODIUM 40 MILLIGRAM(S): 20 TABLET, DELAYED RELEASE ORAL at 06:15

## 2019-01-08 NOTE — DISCHARGE NOTE ADULT - CARE PLAN
Principal Discharge DX:	Heart failure, diastolic  Goal:	symptom free  Assessment and plan of treatment:	as per post cath

## 2019-01-08 NOTE — DISCHARGE NOTE ADULT - CARE PROVIDER_API CALL
Cheryle Arrieta (MD), Cardiovascular Disease; Interventional Cardiology  [address] [place] [address]  Phone: [phone]  Fax: [phone]

## 2019-01-08 NOTE — DISCHARGE NOTE ADULT - ADDITIONAL INSTRUCTIONS
Primary care follow-up appt: Dr. Posadas (Thursday) 1/10/19 at 10:30 a.m.    Cardiology follow-up appt: Dr. Arrieta (Monday) 1/14/19 at 3:50 p.m.

## 2019-01-08 NOTE — PROGRESS NOTE ADULT - ASSESSMENT
HPI:  89 y.o male with PMHx of HLD, SVT, s/p ablation, chronic diastolic heart failure referred for implantable pulmonary artery monitor for further monitoring of volume status and preventing further hospitalization. (04 Jan 2019 14:34)        Patient now s/p Cardiomems implant    - AM labs reviewed   - procedure, outcome and f/u care reviewed with patient  - continue ASA/ Plavix  - continue statin/ Lasix  - DC home today  - f/u with MD in 4-7 days

## 2019-01-08 NOTE — DISCHARGE NOTE ADULT - MEDICATION SUMMARY - MEDICATIONS TO TAKE
I will START or STAY ON the medications listed below when I get home from the hospital:    aspirin 81 mg oral tablet  -- 1 tab(s) by mouth once a day  -- Indication: For CAD    pravastatin 20 mg oral tablet  -- 1 tab(s) by mouth once a day  -- Indication: For HLD (hyperlipidemia)    Lasix 20 mg oral tablet  -- 1 tab(s) by mouth once a day MDD:start 12/16/18  -- Indication: For DIASTOLIC HEART FAILURE    omeprazole 20 mg oral delayed release capsule  -- 1 cap(s) by mouth once a day  -- Indication: For GERD    Vitamin D3 1000 intl units oral tablet  -- 1 tab(s) by mouth once a day  -- Indication: For Supp I will START or STAY ON the medications listed below when I get home from the hospital:    aspirin 81 mg oral tablet  -- 1 tab(s) by mouth once a day  -- Indication: For CAD    pravastatin 20 mg oral tablet  -- 1 tab(s) by mouth once a day  -- Indication: For HLD (hyperlipidemia)    clopidogrel 75 mg oral tablet  -- 1 tab(s) by mouth once a day   -- Do not take aspirin or aspirin containing products without knowledge and consent of your physician.    -- Indication: For CardioMEMS    Lasix 20 mg oral tablet  -- 1 tab(s) by mouth once a day MDD:start 12/16/18  -- Indication: For DIASTOLIC HEART FAILURE    omeprazole 20 mg oral delayed release capsule  -- 1 cap(s) by mouth once a day  -- Indication: For GERD    Vitamin D3 1000 intl units oral tablet  -- 1 tab(s) by mouth once a day  -- Indication: For Supp

## 2019-01-08 NOTE — DISCHARGE NOTE ADULT - PATIENT PORTAL LINK FT
You can access the [facility] Patient Portal, offered by [facility], by registering with the following website: [email or web address]

## 2019-01-08 NOTE — PROGRESS NOTE ADULT - SUBJECTIVE AND OBJECTIVE BOX
HPI:  89 y.o male with PMHx of HLD, SVT, s/p ablation, chronic diastolic heart failure referred for implantable pulmonary artery monitor for further monitoring of volume status and preventing further hospitalization. Now, pt is s/p CardioMems implantation (implantable pulmonary artery pressure monitor)     ROS: denies chest pain/ pressure, SOB or palpitation     Physical exam:   General: awake, no acute distress   HEENT: NCAT, neck supple   CV: RRR, normal S1S2, no murmur/ rub   Pulmonary: clear, no wheezing or rales   GI: +BS, soft, non-tender, non-distended   : voiding freely   Extremities: no edema, + pedal pulses   Skin: no rashes or lesion. Rt. femoral access site (s/p manual pull); no hematoma or bleeding     #  s/p CardioMems implantation today   - post procedure, outcome and follow up care reviewed with patient/ family.  - CardioMems instruction given to patient and family by rep from [employer]   - continue ASA 81 mg daily   - start Plavix 75 mg daily   - continue Lasix   - continue statin  - discharge home today  - follow up with Cardiologist in 4-7 days
Cardiology NP     Patient is a 89y old  Male who presents with a chief complaint of CardioMems (08 Jan 2019 08:45)      HPI:  89 y.o male with PMHx of HLD, SVT, s/p ablation, chronic diastolic heart failure referred for implantable pulmonary artery monitor for further monitoring of volume status and preventing further hospitalization. (04 Jan 2019 14:34)      PAST MEDICAL & SURGICAL HISTORY:  Heart failure, diastolic  SVT (supraventricular tachycardia)  HLD (hyperlipidemia)  [place] (hard of hearing)  Status post ablation of ventricular arrhythmia      MEDICATIONS  (STANDING):  aspirin  chewable 81 milliGRAM(s) Oral daily  atorvastatin 10 milliGRAM(s) Oral at bedtime  clopidogrel Tablet 75 milliGRAM(s) Oral daily  furosemide    Tablet 20 milliGRAM(s) Oral daily  pantoprazole    Tablet 40 milliGRAM(s) Oral before breakfast    MEDICATIONS  (PRN):      Allergies    No Known Allergies    Intolerances        REVIEW OF SYSTEMS: As mentioned in HPI all others Negative     Vital Signs Last 24 Hrs  T(C): 36.1 (08 Jan 2019 05:54), Max: 36.4 (07 Jan 2019 23:07)  T(F): 97 (08 Jan 2019 05:54), Max: 97.5 (07 Jan 2019 23:07)  HR: 79 (08 Jan 2019 07:00) (70 - 90)  BP: 115/76 (08 Jan 2019 07:00) (93/59 - 117/76)  BP(mean): 86 (08 Jan 2019 07:00) (65 - 86)  RR: 27 (08 Jan 2019 07:00) (16 - 27)  SpO2: 97% (08 Jan 2019 07:00) (94% - 100%)    PHYSICAL EXAM:  NERVOUS SYSTEM:  Alert & Oriented X3, Good concentration  CHEST/LUNG: Clear to auscultation bilaterally; No rales, rhonchi, wheezing, or rubs  HEART: Regular rate and rhythm; No murmurs, rubs, or gallops  ABDOMEN: Soft, Nontender, Nondistended; Bowel sounds present  EXTREMITIES:  2+ Peripheral Pulses, No clubbing, cyanosis, or edema  SKIN: No rashes or lesions    LABS:                        14.0   7.41  )-----------( 304      ( 08 Jan 2019 04:50 )             42.3     01-08    139  |  105  |  37<H>  ----------------------------<  88  4.8   |  27  |  1.40<H>    Ca    9.0      08 Jan 2019 04:50          CAPILLARY BLOOD GLUCOSE

## 2019-01-18 ENCOUNTER — INPATIENT (INPATIENT)
Facility: HOSPITAL | Age: 84
LOS: 0 days | Discharge: HOME | End: 2019-01-19
Attending: FAMILY MEDICINE | Admitting: FAMILY MEDICINE
Payer: MEDICARE

## 2019-01-18 VITALS — WEIGHT: 132.06 LBS | HEIGHT: 64 IN

## 2019-01-18 DIAGNOSIS — Z98.890 OTHER SPECIFIED POSTPROCEDURAL STATES: Chronic | ICD-10-CM

## 2019-01-18 LAB
ABO RH CONFIRMATION: SIGNIFICANT CHANGE UP
ALBUMIN SERPL ELPH-MCNC: 2.8 G/DL — LOW (ref 3.3–5)
ALP SERPL-CCNC: 81 U/L — SIGNIFICANT CHANGE UP (ref 40–120)
ALT FLD-CCNC: 19 U/L — SIGNIFICANT CHANGE UP (ref 12–78)
ANION GAP SERPL CALC-SCNC: 8 MMOL/L — SIGNIFICANT CHANGE UP (ref 5–17)
APPEARANCE UR: ABNORMAL
APTT BLD: 31.2 SEC — SIGNIFICANT CHANGE UP (ref 27.5–36.3)
AST SERPL-CCNC: 26 U/L — SIGNIFICANT CHANGE UP (ref 15–37)
BACTERIA # UR AUTO: ABNORMAL
BILIRUB SERPL-MCNC: 0.6 MG/DL — SIGNIFICANT CHANGE UP (ref 0.2–1.2)
BILIRUB UR-MCNC: NEGATIVE — SIGNIFICANT CHANGE UP
BLD GP AB SCN SERPL QL: SIGNIFICANT CHANGE UP
BUN SERPL-MCNC: 33 MG/DL — HIGH (ref 7–23)
CALCIUM SERPL-MCNC: 9.1 MG/DL — SIGNIFICANT CHANGE UP (ref 8.5–10.1)
CHLORIDE SERPL-SCNC: 107 MMOL/L — SIGNIFICANT CHANGE UP (ref 96–108)
CO2 SERPL-SCNC: 25 MMOL/L — SIGNIFICANT CHANGE UP (ref 22–31)
COLOR SPEC: YELLOW — SIGNIFICANT CHANGE UP
CREAT SERPL-MCNC: 1.63 MG/DL — HIGH (ref 0.5–1.3)
DIFF PNL FLD: ABNORMAL
EPI CELLS # UR: SIGNIFICANT CHANGE UP
GLUCOSE SERPL-MCNC: 100 MG/DL — HIGH (ref 70–99)
GLUCOSE UR QL: NEGATIVE MG/DL — SIGNIFICANT CHANGE UP
HCT VFR BLD CALC: 44.7 % — SIGNIFICANT CHANGE UP (ref 39–50)
HGB BLD-MCNC: 14.5 G/DL — SIGNIFICANT CHANGE UP (ref 13–17)
INR BLD: 1.15 RATIO — SIGNIFICANT CHANGE UP (ref 0.88–1.16)
KETONES UR-MCNC: NEGATIVE — SIGNIFICANT CHANGE UP
LEUKOCYTE ESTERASE UR-ACNC: ABNORMAL
MAGNESIUM SERPL-MCNC: 2.1 MG/DL — SIGNIFICANT CHANGE UP (ref 1.6–2.6)
MCHC RBC-ENTMCNC: 30.9 PG — SIGNIFICANT CHANGE UP (ref 27–34)
MCHC RBC-ENTMCNC: 32.4 GM/DL — SIGNIFICANT CHANGE UP (ref 32–36)
MCV RBC AUTO: 95.1 FL — SIGNIFICANT CHANGE UP (ref 80–100)
NITRITE UR-MCNC: NEGATIVE — SIGNIFICANT CHANGE UP
NRBC # BLD: 0 /100 WBCS — SIGNIFICANT CHANGE UP (ref 0–0)
NT-PROBNP SERPL-SCNC: 5283 PG/ML — HIGH (ref 0–450)
PH UR: 5 — SIGNIFICANT CHANGE UP (ref 5–8)
PLATELET # BLD AUTO: 276 K/UL — SIGNIFICANT CHANGE UP (ref 150–400)
POTASSIUM SERPL-MCNC: 5.2 MMOL/L — SIGNIFICANT CHANGE UP (ref 3.5–5.3)
POTASSIUM SERPL-SCNC: 5.2 MMOL/L — SIGNIFICANT CHANGE UP (ref 3.5–5.3)
PROT SERPL-MCNC: 6.8 GM/DL — SIGNIFICANT CHANGE UP (ref 6–8.3)
PROT UR-MCNC: 30 MG/DL
PROTHROM AB SERPL-ACNC: 12.8 SEC — SIGNIFICANT CHANGE UP (ref 10–12.9)
RBC # BLD: 4.7 M/UL — SIGNIFICANT CHANGE UP (ref 4.2–5.8)
RBC # FLD: 13.5 % — SIGNIFICANT CHANGE UP (ref 10.3–14.5)
RBC CASTS # UR COMP ASSIST: ABNORMAL /HPF (ref 0–4)
SODIUM SERPL-SCNC: 140 MMOL/L — SIGNIFICANT CHANGE UP (ref 135–145)
SP GR SPEC: 1.02 — SIGNIFICANT CHANGE UP (ref 1.01–1.02)
TROPONIN I SERPL-MCNC: 0.14 NG/ML — HIGH (ref 0.01–0.04)
TROPONIN I SERPL-MCNC: 0.17 NG/ML — HIGH (ref 0.01–0.04)
TYPE + AB SCN PNL BLD: SIGNIFICANT CHANGE UP
UROBILINOGEN FLD QL: NEGATIVE MG/DL — SIGNIFICANT CHANGE UP
WBC # BLD: 11.81 K/UL — HIGH (ref 3.8–10.5)
WBC # FLD AUTO: 11.81 K/UL — HIGH (ref 3.8–10.5)
WBC UR QL: SIGNIFICANT CHANGE UP /HPF (ref 0–5)

## 2019-01-18 PROCEDURE — 71045 X-RAY EXAM CHEST 1 VIEW: CPT | Mod: 26

## 2019-01-18 PROCEDURE — 93010 ELECTROCARDIOGRAM REPORT: CPT

## 2019-01-18 PROCEDURE — 99285 EMERGENCY DEPT VISIT HI MDM: CPT

## 2019-01-18 RX ORDER — SODIUM CHLORIDE 9 MG/ML
500 INJECTION INTRAMUSCULAR; INTRAVENOUS; SUBCUTANEOUS
Qty: 0 | Refills: 0 | Status: DISCONTINUED | OUTPATIENT
Start: 2019-01-18 | End: 2019-01-19

## 2019-01-18 RX ORDER — NICOTINE POLACRILEX 2 MG
1 GUM BUCCAL DAILY
Qty: 0 | Refills: 0 | Status: DISCONTINUED | OUTPATIENT
Start: 2019-01-18 | End: 2019-01-18

## 2019-01-18 RX ORDER — SODIUM CHLORIDE 9 MG/ML
250 INJECTION INTRAMUSCULAR; INTRAVENOUS; SUBCUTANEOUS ONCE
Qty: 0 | Refills: 0 | Status: COMPLETED | OUTPATIENT
Start: 2019-01-18 | End: 2019-01-18

## 2019-01-18 RX ADMIN — SODIUM CHLORIDE 250 MILLILITER(S): 9 INJECTION INTRAMUSCULAR; INTRAVENOUS; SUBCUTANEOUS at 22:07

## 2019-01-18 RX ADMIN — SODIUM CHLORIDE 50 MILLILITER(S): 9 INJECTION INTRAMUSCULAR; INTRAVENOUS; SUBCUTANEOUS at 22:08

## 2019-01-18 NOTE — CONSULT NOTE ADULT - SUBJECTIVE AND OBJECTIVE BOX
Patient is a 89y old  Male who presents with a chief complaint of SOB.    HPI: 89yom with pmh as stated below s/p recent hospitalization for decompensated HFpEf, s/p Cardiomems insertion presented with c/o SOB. Per wife SOW since AM. no pedal edema.  Pt had not been drinking fluids despite me reinforcing him in the last office visit on 19.  Pt drank one diet snapple today only.  He was noted to be tachycardic as outpt and he was sent to ER.  Since presentation he was in sinus rythm with normal rate at rest.  He was noted to have O2 sat of 91% at presentation.  He denies any CP or pressure.  No distress at rest.  Wife, son and daughter in law at bedside.      PAST MEDICAL & SURGICAL HISTORY:  Heart failure, diastolic  SVT (supraventricular tachycardia)  HLD (hyperlipidemia)  [place] (hard of hearing)  Status post ablation of ventricular arrhythmia      MEDICATIONS  (STANDING):  sodium chloride 0.9% Bolus 250 milliLiter(s) IV Bolus once    MEDICATIONS  (PRN):      FAMILY HISTORY:  Family history of lung cancer (Sibling)      SOCIAL HISTORY:  no recent smoking     REVIEW OF SYSTEMS:  CONSTITUTIONAL:    No fatigue, malaise, lethargy.  No fever or chills.  HEENT:  Eyes:  No visual changes.     ENT:  No epistaxis.  No sinus pain.    RESPIRATORY:  No cough.  No wheeze.  No hemoptysis.  c/o shortness of breath.  CARDIOVASCULAR:  No chest pains.  No palpitations. c/oshortness of breath, No orthopnea or PND.  GASTROINTESTINAL:  No abdominal pain.  No nausea or vomiting.    GENITOURINARY:    No hematuria.    MUSCULOSKELETAL:  No musculoskeletal pain.  No joint swelling.  No arthritis.  NEUROLOGICAL:  No tingling or numbness or weakness.  PSYCHIATRIC:  No confusion  SKIN:  No rashes.    ENDOCRINE:  No unexplained weight loss.  No polydipsia.   HEMATOLOGIC:  No anemia.  No prolonged or excessive bleeding.   ALLERGIC AND IMMUNOLOGIC:  No pruritus.          Vital Signs Last 24 Hrs  T(C): 36.3 (2019 18:00), Max: 36.3 (2019 18:00)  T(F): 97.4 (2019 18:00), Max: 97.4 (2019 18:00)  HR: 81 (2019 18:00) (81 - 81)  BP: 103/64 (2019 18:00) (103/64 - 103/64)  BP(mean): --  RR: 17 (2019 18:00) (17 - 17)  SpO2: 95% (2019 18:00) (95% - 95%)    PHYSICAL EXAM-    Constitutional: no acute distress  Head: Head is normocephalic and atraumatic.      Neck: No jugular venous distention. No audible carotid bruits. There are strong carotid pulses bilaterally. No JVD.     Cardiovascular: Regular rate and rhythm without S3, S4. No murmurs or rubs are appreciated.      Respiratory: Breathsounds are normal. No rales. No wheezing.    Abdomen: Soft, nontender, nondistended with positive bowel sounds.      Extremity: No tenderness. No  pitting edema     Neurologic: The patient is alert and oriented.      Skin: No rash, no obvious lesions noted.      Psychiatric: The patient appears to be emotionally stable.      INTERPRETATION OF TELEMETRY: sinus rythm 80/min    ECG: Sinus rythm , normal axis, biphasic T wave in V5-6.     I&O's Detail      LABS:                        14.5   11.81 )-----------( 276      ( 2019 18:34 )             44.7     01-18    140  |  107  |  33<H>  ----------------------------<  100<H>  5.2   |  25  |  1.63<H>    Ca    9.1      2019 18:34  Mg     2.1     -18    TPro  6.8  /  Alb  2.8<L>  /  TBili  0.6  /  DBili  x   /  AST  26  /  ALT  19  /  AlkPhos  81  01-18    CARDIAC MARKERS ( 2019 18:34 )  0.144 ng/mL / x     / x     / x     / x            Urinalysis Basic - ( 2019 19:50 )    Color: Yellow / Appearance: Slightly Turbid / S.020 / pH: x  Gluc: x / Ketone: Negative  / Bili: Negative / Urobili: Negative mg/dL   Blood: x / Protein: 30 mg/dL / Nitrite: Negative   Leuk Esterase: Moderate / RBC: x / WBC x   Sq Epi: x / Non Sq Epi: x / Bacteria: x      I&O's Summary    BNPSerum Pro-Brain Natriuretic Peptide: 5283 pg/mL ( @ 18:34)    RADIOLOGY & ADDITIONAL STUDIES:  < from: Xray Chest 1 View AP/PA. (19 @ 18:39) >    EXAM:  XR CHEST 1 VIEW                            PROCEDURE DATE:  2019          INTERPRETATION:  History: Chest pain    Chest:  one view.      Comparison: 2018    AP radiograph of the chest demonstrates interval decrease in small LEFT   pleural effusion with small residual and underlying infiltrate or   atelectasis. The cardiac silhouette is normal in size. Osseous structures   are intact.    Impression:interval decrease in small LEFT pleural effusion with small   residual and underlying infiltrate or atelectasis                PRERNA GIBSON M.D., ATTENDING RADIOLOGIST  This document has been electronically signed. 2019  7:17PM        < end of copied text >

## 2019-01-18 NOTE — ED ADULT NURSE NOTE - OBJECTIVE STATEMENT
Sent by Dr. Byers for eval. Patient reports SOB with exertion. Hx of CHF not on water pills. EKG completed, cardiac and O2 monitoring in progress.

## 2019-01-18 NOTE — CONSULT NOTE ADULT - ASSESSMENT
SOB- euvolemic on physical exam.  CXR did not reveal any abnormalities other than trivial L pleural effusion that was smaller in size than before.  Hgb normal level.  He has diastolic dysfunction.  He could be underfilling the ventricle.  Check orthostatic BP readings.  Will give gentle hydration with 500cc NSS at 50cc/hr.  Ambulate pt in am off O2 and check O2 sat wtih ambulation.  Please DO NOT PLACE FLUID RESTRICTION ON THIS PT.  HE IS ANXIOUS ABOUT THIS AND HE IS HAVING VERY POOR PO LIQUID INTAKE.    Elevated troponins- mildly elevated   Cardiac enzymes are mildly elevated and almost in the same range.  This could likely be demand from ongoing clinical noncardiac issues.  Will order stress test as outpt if need be.  Close monitoring recommended for now and pt to f/u with cardiologist after discharge from the hospital as outpt.     Other medical issues- Management per primary team.   Thank you for allowing me to participate in the care of this patient. Please feel free to contact me with any questions.

## 2019-01-18 NOTE — ED PROVIDER NOTE - OBJECTIVE STATEMENT
89 y/o male with a PMHx of CHF on Plavix, HLD, [place], SVT s/p ablation of ventricular arrhythmia, Implantable pulmonary artery monitor (1 month ago) presents to the ED c/o SOB on exertion since this morning. Pt states when walking to the bathroom with his walker he felt SOB today. Pt does not have a h/o of SOB. Dr. Byers called the pt's daughter and told her to bring the pt into the office because he has a fast heart rate on her monitor. Pt then went to Dr. Byers's office where he still had a fast a heart rate and was then sent to [facility]. In ED, pt states that his stool has been black and that he has had a mild cough. Daughter at bedside states since placement of the implantable pulmonary artery monitor the pt has had generalized weakness. Denies bright blood in stool, abd pain, chest pain, urine frequency. Last angiogram 40 years ago. Daily baby ASA. Nonsmoker. No EtOH consumption. 89 y/o male with a PMHx of CHF on Plavix, HLD, [place], SVT s/p ablation of ventricular arrhythmia, Implantable pulmonary artery monitor (1 month ago) presents to the ED c/o SOB on exertion since this morning. Pt states when walking to the bathroom with his walker he felt SOB today. Pt does not have a h/o of SOB. Dr. Byers called the pt's daughter and told her to bring the pt into the office because he has a fast heart rate on her monitor. Pt then went to Dr. Byers's office where he still had a fast a heart rate and was then sent to [facility]. In ED, pt states that his stool has been black and that he has had a mild cough. Daughter at bedside states since placement of the implantable pulmonary artery monitor the pt has had generalized weakness. Denies bright blood in stool, abd pain, chest pain, urine frequency. Daily baby ASA. Nonsmoker. No EtOH consumption.

## 2019-01-18 NOTE — ED ADULT NURSE REASSESSMENT NOTE - NS ED NURSE REASSESS COMMENT FT1
Pt received at shift change a&ox3, no discomfort or distress. No facial droop, confusion, or difficulty speaking. Pt resting safe and comfortable w/o distress. Heparin transfusion running as per order. Will monitor and reassess.

## 2019-01-18 NOTE — ED ADULT NURSE REASSESSMENT NOTE - NS ED NURSE REASSESS COMMENT FT1
Pt received at shift change alert and oriented x3, [place]. Daughter at bedside. Pt had no complaints or discomfort. No sob or chest pain. PT 99% on 3lnc, VSS. Lab called with 1st high troponin. Dr. Wade aware. Will monitor pt.

## 2019-01-18 NOTE — ED ADULT NURSE NOTE - PMH
Heart failure, diastolic    HLD (hyperlipidemia)    [place] (hard of hearing)    SVT (supraventricular tachycardia)

## 2019-01-18 NOTE — ED PROVIDER NOTE - PMH
Heart failure, diastolic    HLD (hyperlipidemia)    [place] (hard of hearing)    SVT (supraventricular tachycardia)

## 2019-01-18 NOTE — ED ADULT NURSE NOTE - NSIMPLEMENTINTERV_GEN_ALL_ED
Implemented All Fall with Harm Risk Interventions:  [place] to call system. Call bell, personal items and telephone within reach. Instruct patient to call for assistance. Room bathroom lighting operational. Non-slip footwear when patient is off stretcher. Physically safe environment: no spills, clutter or unnecessary equipment. Stretcher in lowest position, wheels locked, appropriate side rails in place. Provide visual cue, wrist band, yellow gown, etc. Monitor gait and stability. Monitor for mental status changes and reorient to person, place, and time. Review medications for side effects contributing to fall risk. Reinforce activity limits and safety measures with patient and family. Provide visual clues: red socks.

## 2019-01-18 NOTE — ED PROVIDER NOTE - PROGRESS NOTE DETAILS
Stool guaiac performed by Dr. Wade. Lot #: [id number]; Result: negative; Brown stool. d/w  and giselle mcclellan at bedside with patient. suspect dehydration. will gently hydrate. check second troponin. admit to tele. md ynes

## 2019-01-19 ENCOUNTER — TRANSCRIPTION ENCOUNTER (OUTPATIENT)
Age: 84
End: 2019-01-19

## 2019-01-19 VITALS — WEIGHT: 139.99 LBS

## 2019-01-19 PROBLEM — E78.5 HYPERLIPIDEMIA, UNSPECIFIED: Chronic | Status: ACTIVE | Noted: 2019-01-04

## 2019-01-19 PROBLEM — I47.1 SUPRAVENTRICULAR TACHYCARDIA: Chronic | Status: ACTIVE | Noted: 2019-01-04

## 2019-01-19 PROBLEM — I50.30 UNSPECIFIED DIASTOLIC (CONGESTIVE) HEART FAILURE: Chronic | Status: ACTIVE | Noted: 2019-01-04

## 2019-01-19 LAB
ANION GAP SERPL CALC-SCNC: 4 MMOL/L — LOW (ref 5–17)
BASOPHILS # BLD AUTO: 0.05 K/UL — SIGNIFICANT CHANGE UP (ref 0–0.2)
BASOPHILS NFR BLD AUTO: 0.5 % — SIGNIFICANT CHANGE UP (ref 0–2)
BUN SERPL-MCNC: 31 MG/DL — HIGH (ref 7–23)
CALCIUM SERPL-MCNC: 8.6 MG/DL — SIGNIFICANT CHANGE UP (ref 8.5–10.1)
CHLORIDE SERPL-SCNC: 108 MMOL/L — SIGNIFICANT CHANGE UP (ref 96–108)
CO2 SERPL-SCNC: 28 MMOL/L — SIGNIFICANT CHANGE UP (ref 22–31)
CREAT SERPL-MCNC: 1.46 MG/DL — HIGH (ref 0.5–1.3)
EOSINOPHIL # BLD AUTO: 0.21 K/UL — SIGNIFICANT CHANGE UP (ref 0–0.5)
EOSINOPHIL NFR BLD AUTO: 2.2 % — SIGNIFICANT CHANGE UP (ref 0–6)
GLUCOSE SERPL-MCNC: 85 MG/DL — SIGNIFICANT CHANGE UP (ref 70–99)
HCT VFR BLD CALC: 40.9 % — SIGNIFICANT CHANGE UP (ref 39–50)
HGB BLD-MCNC: 13.2 G/DL — SIGNIFICANT CHANGE UP (ref 13–17)
IMM GRANULOCYTES NFR BLD AUTO: 0.4 % — SIGNIFICANT CHANGE UP (ref 0–1.5)
LYMPHOCYTES # BLD AUTO: 1.27 K/UL — SIGNIFICANT CHANGE UP (ref 1–3.3)
LYMPHOCYTES # BLD AUTO: 13.1 % — SIGNIFICANT CHANGE UP (ref 13–44)
MCHC RBC-ENTMCNC: 30.8 PG — SIGNIFICANT CHANGE UP (ref 27–34)
MCHC RBC-ENTMCNC: 32.3 GM/DL — SIGNIFICANT CHANGE UP (ref 32–36)
MCV RBC AUTO: 95.6 FL — SIGNIFICANT CHANGE UP (ref 80–100)
MONOCYTES # BLD AUTO: 1.08 K/UL — HIGH (ref 0–0.9)
MONOCYTES NFR BLD AUTO: 11.1 % — SIGNIFICANT CHANGE UP (ref 2–14)
NEUTROPHILS # BLD AUTO: 7.05 K/UL — SIGNIFICANT CHANGE UP (ref 1.8–7.4)
NEUTROPHILS NFR BLD AUTO: 72.7 % — SIGNIFICANT CHANGE UP (ref 43–77)
NRBC # BLD: 0 /100 WBCS — SIGNIFICANT CHANGE UP (ref 0–0)
PLATELET # BLD AUTO: 253 K/UL — SIGNIFICANT CHANGE UP (ref 150–400)
POTASSIUM SERPL-MCNC: 4.5 MMOL/L — SIGNIFICANT CHANGE UP (ref 3.5–5.3)
POTASSIUM SERPL-SCNC: 4.5 MMOL/L — SIGNIFICANT CHANGE UP (ref 3.5–5.3)
RBC # BLD: 4.28 M/UL — SIGNIFICANT CHANGE UP (ref 4.2–5.8)
RBC # FLD: 13.7 % — SIGNIFICANT CHANGE UP (ref 10.3–14.5)
SODIUM SERPL-SCNC: 140 MMOL/L — SIGNIFICANT CHANGE UP (ref 135–145)
TROPONIN I SERPL-MCNC: 0.17 NG/ML — HIGH (ref 0.01–0.04)
WBC # BLD: 9.7 K/UL — SIGNIFICANT CHANGE UP (ref 3.8–10.5)
WBC # FLD AUTO: 9.7 K/UL — SIGNIFICANT CHANGE UP (ref 3.8–10.5)

## 2019-01-19 PROCEDURE — 93010 ELECTROCARDIOGRAM REPORT: CPT

## 2019-01-19 RX ORDER — PANTOPRAZOLE SODIUM 20 MG/1
40 TABLET, DELAYED RELEASE ORAL
Qty: 0 | Refills: 0 | Status: DISCONTINUED | OUTPATIENT
Start: 2019-01-19 | End: 2019-01-19

## 2019-01-19 RX ORDER — ASPIRIN/CALCIUM CARB/MAGNESIUM 324 MG
81 TABLET ORAL DAILY
Qty: 0 | Refills: 0 | Status: DISCONTINUED | OUTPATIENT
Start: 2019-01-19 | End: 2019-01-19

## 2019-01-19 RX ORDER — CLOPIDOGREL BISULFATE 75 MG/1
75 TABLET, FILM COATED ORAL DAILY
Qty: 0 | Refills: 0 | Status: DISCONTINUED | OUTPATIENT
Start: 2019-01-19 | End: 2019-01-19

## 2019-01-19 RX ORDER — ACETAMINOPHEN 500 MG
650 TABLET ORAL EVERY 6 HOURS
Qty: 0 | Refills: 0 | Status: DISCONTINUED | OUTPATIENT
Start: 2019-01-19 | End: 2019-01-19

## 2019-01-19 RX ORDER — HEPARIN SODIUM 5000 [USP'U]/ML
5000 INJECTION INTRAVENOUS; SUBCUTANEOUS EVERY 8 HOURS
Qty: 0 | Refills: 0 | Status: DISCONTINUED | OUTPATIENT
Start: 2019-01-19 | End: 2019-01-19

## 2019-01-19 RX ORDER — IPRATROPIUM/ALBUTEROL SULFATE 18-103MCG
3 AEROSOL WITH ADAPTER (GRAM) INHALATION EVERY 6 HOURS
Qty: 0 | Refills: 0 | Status: DISCONTINUED | OUTPATIENT
Start: 2019-01-19 | End: 2019-01-19

## 2019-01-19 RX ADMIN — HEPARIN SODIUM 5000 UNIT(S): 5000 INJECTION INTRAVENOUS; SUBCUTANEOUS at 05:25

## 2019-01-19 RX ADMIN — Medication 81 MILLIGRAM(S): at 12:34

## 2019-01-19 RX ADMIN — CLOPIDOGREL BISULFATE 75 MILLIGRAM(S): 75 TABLET, FILM COATED ORAL at 12:34

## 2019-01-19 RX ADMIN — Medication 1 TABLET(S): at 12:34

## 2019-01-19 RX ADMIN — PANTOPRAZOLE SODIUM 40 MILLIGRAM(S): 20 TABLET, DELAYED RELEASE ORAL at 05:25

## 2019-01-19 RX ADMIN — HEPARIN SODIUM 5000 UNIT(S): 5000 INJECTION INTRAVENOUS; SUBCUTANEOUS at 12:34

## 2019-01-19 NOTE — H&P ADULT - PMH
Heart failure, diastolic    HLD (hyperlipidemia)    [place] (hard of hearing)    SVT (supraventricular tachycardia)

## 2019-01-19 NOTE — DISCHARGE NOTE ADULT - PATIENT PORTAL LINK FT
You can access the [facility] Patient Portal, offered by [facility], by registering with the following website: [email or web address]

## 2019-01-19 NOTE — DISCHARGE NOTE ADULT - HOSPITAL COURSE
89N s/p recent hospitalization for decompensated HFpEf, s/p Cardiomems insertion presented with c/o SOB. Per wife SOW since AM day of admission. no pedal edema.  poor PO fluid intake.He was noted to be tachycardic as outpt and he was sent to ER.  Since presentation he was in sinus rythm with normal rate at rest.  He was noted to have O2 sat of 91% at presentation.    dyspnea resolved.  euvolemic.  hx HFpEF, not in acute exacerbation.  O2 saturation stable at rest.  continue medical management.  equivocal TnI due to demand ischemia.  orthostatic BP readings, inc'd HR.  improved after IVFs.  check O2 sat wtih ambulation.  if stable may discharge today.

## 2019-01-19 NOTE — H&P ADULT - NSHPPHYSICALEXAM_GEN_ALL_CORE
Vital Signs Last 24 Hrs  T(C): 36.8 (19 Jan 2019 00:38), Max: 36.8 (19 Jan 2019 00:38)  T(F): 98.3 (19 Jan 2019 00:38), Max: 98.3 (19 Jan 2019 00:38)  HR: 80 (19 Jan 2019 00:38) (80 - 101)  BP: 106/62 (19 Jan 2019 00:38) (103/64 - 120/57)  RR: 18 (19 Jan 2019 00:38) (17 - 18)  SpO2: 100% (19 Jan 2019 00:38) (95% - 100%)

## 2019-01-19 NOTE — PROGRESS NOTE ADULT - ASSESSMENT
A/P: 89yom with pmh as stated below s/p recent hospitalization for decompensated HFpEf, s/p Cardiomems insertion presented with c/o SOB.    1. SOB- euvolemic on physical exam. SR on tele, normal LV fxn on recent echo.  CXR did not reveal any abnormalities other than trivial L pleural effusion that was smaller in size than before.  Hgb normal level.  He has diastolic dysfunction. He could be underfilling the ventricle.  Check orthostatic BP readings.   Ambulate pt in am off O2 and check O2 sat wtih ambulation.  Cont current meds for now.    2. Elevated troponins- mildly elevated. Cont conservative medical mgmt for now. No active CP at present.  Cardiac enzymes are mildly elevated and almost in the same range.    This could likely be demand from ongoing clinical noncardiac issues.  Close monitoring recommended for now.    3. DVT proph, replete lytes as needed.

## 2019-01-19 NOTE — PROGRESS NOTE ADULT - ASSESSMENT
family at bedside.  patient doing comfortable and desires to be discharged home today with his family.  ambulating, tolerating diet, voiding and having normal bowel movements.  ROS:  (-) dyspnea.  (-) cough.    5yom with pmh as stated below s/p recent hospitalization for decompensated HFpEf, s/p Cardiomems insertion presented with c/o SOB. Per wife SOW since AM. no pedal edema.  Pt had not been drinking fluids despite me reinforcing him in the last office visit on 1/14/19.  Pt drank one diet snapple today only.  He was noted to be tachycardic as outpt and he was sent to ER.  Since presentation he was in sinus rythm with normal rate at rest.  He was noted to have O2 sat of 91% at presentation.    dyspnea resolved.  euvolemic.  hx HFpEF, not in acute exacerbation.  continue medical management.  equivocal TnI due to demand ischemia.  orthostatic BP readings, inc'd HR.  improved after IVFs.  check O2 sat wtih ambulation.

## 2019-01-19 NOTE — H&P ADULT - ASSESSMENT
89 yo male presented with SOB.    A/P:    1.  Shortness of breath-improved now  HFpEF  Dehydration  Elevated troponin  Abnormal EKG  -as per the cardiologist evaluation, patient is at Euvolemic status now, he does not have excess fluid in the body, rather he is dehydrated,  -So he will get mild IV hydration with NS  -will follow clinically  -elevated troponin most likely due to demand ischemia, he has no chest pain now  -will follow orthostatics  -continue aspirin and plavix     2.  GERD  -on PPI    3.  Heparin for DVT ppx      4.  Code status: Full code.

## 2019-01-19 NOTE — H&P ADULT - HISTORY OF PRESENT ILLNESS
89 yo male with a PMHx of CHF on Plavix, HLD, [place], SVT s/p ablation of ventricular arrhythmia, Implantable pulmonary artery monitor (1 month ago) presented to the ED c/o SOB on exertion since this morning. Pt states when walking to the bathroom with his walker he felt SOB today. Pt does not have a h/o of SOB. Dr. Byers called the pt's daughter and told her to bring the pt into the office because he has a fast heart rate on her monitor. Pt then went to Dr. Byers's office where he still had a fast a heart rate and was then sent to [facility]. In ED, pt states that his stool has been black and that he has had a mild cough. Daughter at bedside states since placement of the implantable pulmonary artery monitor the pt has had generalized weakness. Denies bright blood in stool, abd pain, chest pain, urine frequency. Daily baby ASA. Nonsmoker. No EtOH consumption.    Family hx:  Patient is unable to provide detail family history at this time.

## 2019-01-19 NOTE — PROGRESS NOTE ADULT - SUBJECTIVE AND OBJECTIVE BOX
cardiology Progress Note  Patient is a 89y old  Male who presents with a chief complaint of SOB.    HPI: 89yom with pmh as stated below s/p recent hospitalization for decompensated HFpEf, s/p Cardiomems insertion presented with c/o SOB. Per wife SOW since AM. no pedal edema.  Pt had not been drinking fluids despite me reinforcing him in the last office visit on 19.  Pt drank one diet snapple today only.  He was noted to be tachycardic as outpt and he was sent to ER.  Since presentation he was in sinus rythm with normal rate at rest.  He was noted to have O2 sat of 91% at presentation.    . Chart reviewed. No CP/SOB. SR on tele.   No events last pm.    PAST MEDICAL & SURGICAL HISTORY:  Heart failure, diastolic  SVT (supraventricular tachycardia)  HLD (hyperlipidemia)  [place] (hard of hearing)  Status post ablation of ventricular arrhythmia    MEDICATIONS  (STANDING):  sodium chloride 0.9% Bolus 250 milliLiter(s) IV Bolus once    FAMILY HISTORY: Family history of lung cancer (Sibling)    SOCIAL HISTORY: no recent smoking     REVIEW OF SYSTEMS:  CONSTITUTIONAL:    No fatigue, malaise, lethargy.  No fever or chills.  HEENT:  Eyes:  No visual changes.     ENT:  No epistaxis.  No sinus pain.    RESPIRATORY:  No cough.  No wheeze.  No hemoptysis.  c/o shortness of breath.  CARDIOVASCULAR:  No chest pains.  No palpitations. c/oshortness of breath, No orthopnea or PND.  GASTROINTESTINAL:  No abdominal pain.  No nausea or vomiting.    GENITOURINARY:    No hematuria.    MUSCULOSKELETAL:  No musculoskeletal pain.  No joint swelling.  No arthritis.  NEUROLOGICAL:  No tingling or numbness or weakness.    Vital Signs Last 24 Hrs  T(C): 36.3 (2019 18:00), Max: 36.3 (2019 18:00)  T(F): 97.4 (2019 18:00), Max: 97.4 (2019 18:00)  HR: 81 (2019 18:00) (81 - 81)  BP: 103/64 (2019 18:00) (103/64 - 103/64)  BP(mean): --  RR: 17 (2019 18:00) (17 - 17)  SpO2: 95% (2019 18:00) (95% - 95%)    PHYSICAL EXAM-    Constitutional: no acute distress  Head: Head is normocephalic and atraumatic.    Neck: No jugular venous distention. No audible carotid bruits. There are strong carotid pulses bilaterally. No JVD.   Cardiovascular: Regular rate and rhythm without S3, S4. No murmurs or rubs are appreciated.    Respiratory: Breathsounds are normal. No rales. No wheezing.  Abdomen: Soft, nontender, nondistended with positive bowel sounds.    Extremity: No tenderness. No  pitting edema   Neurologic: The patient is alert and oriented.      INTERPRETATION OF TELEMETRY: sinus rythm 80/min    ECG: Sinus rythm , normal axis, biphasic T wave in V5-6.     I&O's Detail      LABS:                        14.5   11.81 )-----------( 276      ( 2019 18:34 )             44.7         140  |  107  |  33<H>  ----------------------------<  100<H>  5.2   |  25  |  1.63<H>    Ca    9.1      2019 18:34  Mg     2.1         TPro  6.8  /  Alb  2.8<L>  /  TBili  0.6  /  DBili  x   /  AST  26  /  ALT  19  /  AlkPhos  81      CARDIAC MARKERS ( 2019 18:34 )  0.144 ng/mL / x     / x     / x     / x        Urinalysis Basic - ( 2019 19:50 )    Color: Yellow / Appearance: Slightly Turbid / S.020 / pH: x  Gluc: x / Ketone: Negative  / Bili: Negative / Urobili: Negative mg/dL   Blood: x / Protein: 30 mg/dL / Nitrite: Negative   Leuk Esterase: Moderate / RBC: x / WBC x   Sq Epi: x / Non Sq Epi: x / Bacteria: x    I&O's Summary    BNPSerum Pro-Brain Natriuretic Peptide: 5283 pg/mL ( @ 18:34)    RADIOLOGY & ADDITIONAL STUDIES:  < from: Xray Chest 1 View AP/PA. (19 @ 18:39) >  INTERPRETATION:  History: Chest pain  Chest:  one view.    Comparison: 2018    AP radiograph of the chest demonstrates interval decrease in small LEFT   pleural effusion with small residual and underlying infiltrate or   atelectasis. The cardiac silhouette is normal in size. Osseous structures   are intact.    Impression:interval decrease in small LEFT pleural effusion with small   residual and underlying infiltrate or atelectasis      < from: Transthoracic Echocardiogram (12.12.18 @ 10:16) >  The left atrium is mildly dilated.   Mild to Moderate concentric left ventricular hypertrophy is present.   At least Moderate left ventricular apical hypertrophy is present.   Estimated left ventricular ejection fraction is 60-65 %.   Normal appearing right atrium.   Normal appearing right ventricle structure and function.   All visualized extra cardiac structures appears to be normal.   No evidence of pericardial effusion.   Pleural effusion - is present..   Fibrocalcific changes noted to the mitral valve leaflets with preserved   leaflet excursion.   Moderate (2+) mitral regurgitation is present.   Fibrocalcific changes noted to the Aortic valve leaflets with preserved   with minimally restricted leaflet excursion.   Trace aortic regurgitation is present.   Moderate (2+) tricuspid valve regurgitation is present.   Normal appearing pulmonic valve structure and function.    < end of copied text >

## 2019-01-19 NOTE — DISCHARGE NOTE ADULT - CARE PROVIDER_API CALL
Radha New), Cardiovascular Disease; Internal Medicine  [address] [place] [address]  Phone: [phone]  Fax: [phone]

## 2019-01-19 NOTE — PROGRESS NOTE ADULT - SUBJECTIVE AND OBJECTIVE BOX
CC:  Patient is a 89y old  Male who presents with a chief complaint of complain of sob (19 Jan 2019 09:22)    SUBJECTIVE:     ROS:  all other review of systems are negative unless indicated above.    acetaminophen   Tablet .. 650 milliGRAM(s) Oral every 6 hours PRN  ALBUTerol/ipratropium for Nebulization 3 milliLiter(s) Nebulizer every 6 hours PRN  aspirin enteric coated 81 milliGRAM(s) Oral daily  clopidogrel Tablet 75 milliGRAM(s) Oral daily  heparin  Injectable 5000 Unit(s) SubCutaneous every 8 hours  multivitamin 1 Tablet(s) Oral daily  pantoprazole    Tablet 40 milliGRAM(s) Oral before breakfast  sodium chloride 0.9%. 500 milliLiter(s) IV Continuous <Continuous>    T(C): 36.7 (01-19-19 @ 11:05), Max: 36.8 (01-19-19 @ 00:38)  HR: 82 (01-19-19 @ 11:05) (76 - 101)  BP: 103/59 (01-19-19 @ 11:05) (99/57 - 120/57)  RR: 18 (01-19-19 @ 11:05) (17 - 18)  SpO2: 97% (01-19-19 @ 11:05) (95% - 100%)    Constitutional: NAD.   HEENT: PERRL, EOMI, MMM.  Neck: Soft and supple, No carotid bruit, No JVD  Respiratory: Breath sounds are clear bilaterally, No wheezing, rales or rhonchi  Cardiovascular: S1 and S2, regular rate and rhythm, no murmur, rub or gallop.  Gastrointestinal: Bowel Sounds present, soft, nontender, nondistended, no guarding, no rebound, no mass.  Extremities: No peripheral edema  Vascular: 2+ peripheral pulses  Neurological: A/O x , no focal deficits  Musculoskeletal: 5/5 strength b/l upper and lower extremities  Skin:  no visible rashes.                         13.2   9.70  )-----------( 253      ( 19 Jan 2019 05:45 )             40.9     PT/INR - ( 18 Jan 2019 21:19 )   PT: 12.8 sec;   INR: 1.15 ratio         PTT - ( 18 Jan 2019 21:19 )  PTT:31.2 sec  01-19    140  |  108  |  31<H>  ----------------------------<  85  4.5   |  28  |  1.46<H>    Ca    8.6      19 Jan 2019 05:45  Mg     2.1     01-18    TPro  6.8  /  Alb  2.8<L>  /  TBili  0.6  /  DBili  x   /  AST  26  /  ALT  19  /  AlkPhos  81  01-18

## 2019-01-23 DIAGNOSIS — I50.30 UNSPECIFIED DIASTOLIC (CONGESTIVE) HEART FAILURE: ICD-10-CM

## 2019-01-23 DIAGNOSIS — H91.90 UNSPECIFIED HEARING LOSS, UNSPECIFIED EAR: ICD-10-CM

## 2019-01-23 DIAGNOSIS — I95.1 ORTHOSTATIC HYPOTENSION: ICD-10-CM

## 2019-01-23 DIAGNOSIS — Z96.89 PRESENCE OF OTHER SPECIFIED FUNCTIONAL IMPLANTS: ICD-10-CM

## 2019-01-23 DIAGNOSIS — E78.5 HYPERLIPIDEMIA, UNSPECIFIED: ICD-10-CM

## 2019-01-23 DIAGNOSIS — K21.9 GASTRO-ESOPHAGEAL REFLUX DISEASE WITHOUT ESOPHAGITIS: ICD-10-CM

## 2019-01-23 DIAGNOSIS — I24.8 OTHER FORMS OF ACUTE ISCHEMIC HEART DISEASE: ICD-10-CM

## 2019-01-23 DIAGNOSIS — Z79.02 LONG TERM (CURRENT) USE OF ANTITHROMBOTICS/ANTIPLATELETS: ICD-10-CM

## 2019-01-23 DIAGNOSIS — E86.0 DEHYDRATION: ICD-10-CM

## 2019-01-23 DIAGNOSIS — R06.02 SHORTNESS OF BREATH: ICD-10-CM

## 2019-01-23 DIAGNOSIS — Z79.82 LONG TERM (CURRENT) USE OF ASPIRIN: ICD-10-CM

## 2019-04-11 RX ORDER — AZITHROMYCIN 500 MG/1
0 TABLET, FILM COATED ORAL
Qty: 0 | Refills: 0 | COMMUNITY
Start: 2019-04-11 | End: 2019-04-15

## 2019-04-16 ENCOUNTER — INPATIENT (INPATIENT)
Facility: HOSPITAL | Age: 84
LOS: 1 days | Discharge: HOME | End: 2019-04-18
Attending: HOSPITALIST | Admitting: HOSPITALIST
Payer: MEDICARE

## 2019-04-16 VITALS — WEIGHT: 128.09 LBS | HEIGHT: 64 IN

## 2019-04-16 DIAGNOSIS — Z98.890 OTHER SPECIFIED POSTPROCEDURAL STATES: Chronic | ICD-10-CM

## 2019-04-16 LAB
ALBUMIN SERPL ELPH-MCNC: 2.8 G/DL — LOW (ref 3.3–5)
ALP SERPL-CCNC: 85 U/L — SIGNIFICANT CHANGE UP (ref 40–120)
ALT FLD-CCNC: 15 U/L — SIGNIFICANT CHANGE UP (ref 12–78)
ANION GAP SERPL CALC-SCNC: 7 MMOL/L — SIGNIFICANT CHANGE UP (ref 5–17)
APPEARANCE UR: CLEAR — SIGNIFICANT CHANGE UP
APTT BLD: 34.5 SEC — SIGNIFICANT CHANGE UP (ref 27.5–36.3)
APTT BLD: > 200 SEC (ref 27.5–36.3)
AST SERPL-CCNC: 19 U/L — SIGNIFICANT CHANGE UP (ref 15–37)
BASOPHILS # BLD AUTO: 0.08 K/UL — SIGNIFICANT CHANGE UP (ref 0–0.2)
BASOPHILS NFR BLD AUTO: 0.8 % — SIGNIFICANT CHANGE UP (ref 0–2)
BILIRUB SERPL-MCNC: 0.4 MG/DL — SIGNIFICANT CHANGE UP (ref 0.2–1.2)
BILIRUB UR-MCNC: NEGATIVE — SIGNIFICANT CHANGE UP
BLD GP AB SCN SERPL QL: SIGNIFICANT CHANGE UP
BUN SERPL-MCNC: 25 MG/DL — HIGH (ref 7–23)
CALCIUM SERPL-MCNC: 9.3 MG/DL — SIGNIFICANT CHANGE UP (ref 8.5–10.1)
CHLORIDE SERPL-SCNC: 106 MMOL/L — SIGNIFICANT CHANGE UP (ref 96–108)
CK SERPL-CCNC: 42 U/L — SIGNIFICANT CHANGE UP (ref 26–308)
CO2 SERPL-SCNC: 26 MMOL/L — SIGNIFICANT CHANGE UP (ref 22–31)
COLOR SPEC: YELLOW — SIGNIFICANT CHANGE UP
CREAT SERPL-MCNC: 1.3 MG/DL — SIGNIFICANT CHANGE UP (ref 0.5–1.3)
DIFF PNL FLD: NEGATIVE — SIGNIFICANT CHANGE UP
EOSINOPHIL # BLD AUTO: 0.4 K/UL — SIGNIFICANT CHANGE UP (ref 0–0.5)
EOSINOPHIL NFR BLD AUTO: 4 % — SIGNIFICANT CHANGE UP (ref 0–6)
GLUCOSE SERPL-MCNC: 83 MG/DL — SIGNIFICANT CHANGE UP (ref 70–99)
GLUCOSE UR QL: NEGATIVE MG/DL — SIGNIFICANT CHANGE UP
HCT VFR BLD CALC: 38.4 % — LOW (ref 39–50)
HCT VFR BLD CALC: 41.9 % — SIGNIFICANT CHANGE UP (ref 39–50)
HGB BLD-MCNC: 12.5 G/DL — LOW (ref 13–17)
HGB BLD-MCNC: 13.6 G/DL — SIGNIFICANT CHANGE UP (ref 13–17)
IMM GRANULOCYTES NFR BLD AUTO: 0.3 % — SIGNIFICANT CHANGE UP (ref 0–1.5)
INR BLD: 1.07 RATIO — SIGNIFICANT CHANGE UP (ref 0.88–1.16)
KETONES UR-MCNC: NEGATIVE — SIGNIFICANT CHANGE UP
LEUKOCYTE ESTERASE UR-ACNC: NEGATIVE — SIGNIFICANT CHANGE UP
LYMPHOCYTES # BLD AUTO: 1.23 K/UL — SIGNIFICANT CHANGE UP (ref 1–3.3)
LYMPHOCYTES # BLD AUTO: 12.2 % — LOW (ref 13–44)
MCHC RBC-ENTMCNC: 29.8 PG — SIGNIFICANT CHANGE UP (ref 27–34)
MCHC RBC-ENTMCNC: 30.3 PG — SIGNIFICANT CHANGE UP (ref 27–34)
MCHC RBC-ENTMCNC: 32.5 GM/DL — SIGNIFICANT CHANGE UP (ref 32–36)
MCHC RBC-ENTMCNC: 32.6 GM/DL — SIGNIFICANT CHANGE UP (ref 32–36)
MCV RBC AUTO: 91.9 FL — SIGNIFICANT CHANGE UP (ref 80–100)
MCV RBC AUTO: 93 FL — SIGNIFICANT CHANGE UP (ref 80–100)
MONOCYTES # BLD AUTO: 0.89 K/UL — SIGNIFICANT CHANGE UP (ref 0–0.9)
MONOCYTES NFR BLD AUTO: 8.8 % — SIGNIFICANT CHANGE UP (ref 2–14)
NEUTROPHILS # BLD AUTO: 7.47 K/UL — HIGH (ref 1.8–7.4)
NEUTROPHILS NFR BLD AUTO: 73.9 % — SIGNIFICANT CHANGE UP (ref 43–77)
NITRITE UR-MCNC: NEGATIVE — SIGNIFICANT CHANGE UP
NRBC # BLD: 0 /100 WBCS — SIGNIFICANT CHANGE UP (ref 0–0)
NRBC # BLD: 0 /100 WBCS — SIGNIFICANT CHANGE UP (ref 0–0)
NT-PROBNP SERPL-SCNC: 3392 PG/ML — HIGH (ref 0–450)
PH UR: 5 — SIGNIFICANT CHANGE UP (ref 5–8)
PLATELET # BLD AUTO: 349 K/UL — SIGNIFICANT CHANGE UP (ref 150–400)
PLATELET # BLD AUTO: 370 K/UL — SIGNIFICANT CHANGE UP (ref 150–400)
POTASSIUM SERPL-MCNC: 5.5 MMOL/L — HIGH (ref 3.5–5.3)
POTASSIUM SERPL-SCNC: 5.5 MMOL/L — HIGH (ref 3.5–5.3)
PROT SERPL-MCNC: 6.7 GM/DL — SIGNIFICANT CHANGE UP (ref 6–8.3)
PROT UR-MCNC: NEGATIVE MG/DL — SIGNIFICANT CHANGE UP
PROTHROM AB SERPL-ACNC: 11.9 SEC — SIGNIFICANT CHANGE UP (ref 10–12.9)
RBC # BLD: 4.13 M/UL — LOW (ref 4.2–5.8)
RBC # BLD: 4.56 M/UL — SIGNIFICANT CHANGE UP (ref 4.2–5.8)
RBC # FLD: 14.4 % — SIGNIFICANT CHANGE UP (ref 10.3–14.5)
RBC # FLD: 14.5 % — SIGNIFICANT CHANGE UP (ref 10.3–14.5)
SODIUM SERPL-SCNC: 139 MMOL/L — SIGNIFICANT CHANGE UP (ref 135–145)
SP GR SPEC: 1 — LOW (ref 1.01–1.02)
TROPONIN I SERPL-MCNC: 0.11 NG/ML — HIGH (ref 0.01–0.04)
TROPONIN I SERPL-MCNC: 0.11 NG/ML — HIGH (ref 0.01–0.04)
TROPONIN I SERPL-MCNC: 0.15 NG/ML — HIGH (ref 0.01–0.04)
TYPE + AB SCN PNL BLD: SIGNIFICANT CHANGE UP
UROBILINOGEN FLD QL: NEGATIVE MG/DL — SIGNIFICANT CHANGE UP
WBC # BLD: 10.1 K/UL — SIGNIFICANT CHANGE UP (ref 3.8–10.5)
WBC # BLD: 8.08 K/UL — SIGNIFICANT CHANGE UP (ref 3.8–10.5)
WBC # FLD AUTO: 10.1 K/UL — SIGNIFICANT CHANGE UP (ref 3.8–10.5)
WBC # FLD AUTO: 8.08 K/UL — SIGNIFICANT CHANGE UP (ref 3.8–10.5)

## 2019-04-16 PROCEDURE — 93010 ELECTROCARDIOGRAM REPORT: CPT

## 2019-04-16 PROCEDURE — 99285 EMERGENCY DEPT VISIT HI MDM: CPT

## 2019-04-16 PROCEDURE — 71045 X-RAY EXAM CHEST 1 VIEW: CPT | Mod: 26

## 2019-04-16 RX ORDER — HEPARIN SODIUM 5000 [USP'U]/ML
4500 INJECTION INTRAVENOUS; SUBCUTANEOUS EVERY 6 HOURS
Qty: 0 | Refills: 0 | Status: DISCONTINUED | OUTPATIENT
Start: 2019-04-16 | End: 2019-04-17

## 2019-04-16 RX ORDER — HEPARIN SODIUM 5000 [USP'U]/ML
INJECTION INTRAVENOUS; SUBCUTANEOUS
Qty: 25000 | Refills: 0 | Status: DISCONTINUED | OUTPATIENT
Start: 2019-04-16 | End: 2019-04-17

## 2019-04-16 RX ORDER — IPRATROPIUM/ALBUTEROL SULFATE 18-103MCG
3 AEROSOL WITH ADAPTER (GRAM) INHALATION EVERY 6 HOURS
Qty: 0 | Refills: 0 | Status: DISCONTINUED | OUTPATIENT
Start: 2019-04-16 | End: 2019-04-18

## 2019-04-16 RX ORDER — ACETAMINOPHEN 500 MG
650 TABLET ORAL EVERY 6 HOURS
Qty: 0 | Refills: 0 | Status: DISCONTINUED | OUTPATIENT
Start: 2019-04-16 | End: 2019-04-18

## 2019-04-16 RX ORDER — HEPARIN SODIUM 5000 [USP'U]/ML
4500 INJECTION INTRAVENOUS; SUBCUTANEOUS ONCE
Qty: 0 | Refills: 0 | Status: COMPLETED | OUTPATIENT
Start: 2019-04-16 | End: 2019-04-16

## 2019-04-16 RX ORDER — ASPIRIN/CALCIUM CARB/MAGNESIUM 324 MG
81 TABLET ORAL DAILY
Qty: 0 | Refills: 0 | Status: DISCONTINUED | OUTPATIENT
Start: 2019-04-16 | End: 2019-04-18

## 2019-04-16 RX ORDER — OMEPRAZOLE 10 MG/1
1 CAPSULE, DELAYED RELEASE ORAL
Qty: 0 | Refills: 0 | COMMUNITY

## 2019-04-16 RX ORDER — SODIUM CHLORIDE 9 MG/ML
3 INJECTION INTRAMUSCULAR; INTRAVENOUS; SUBCUTANEOUS ONCE
Qty: 0 | Refills: 0 | Status: COMPLETED | OUTPATIENT
Start: 2019-04-16 | End: 2019-04-16

## 2019-04-16 RX ORDER — CHOLECALCIFEROL (VITAMIN D3) 125 MCG
1000 CAPSULE ORAL DAILY
Qty: 0 | Refills: 0 | Status: DISCONTINUED | OUTPATIENT
Start: 2019-04-16 | End: 2019-04-18

## 2019-04-16 RX ORDER — PANTOPRAZOLE SODIUM 20 MG/1
40 TABLET, DELAYED RELEASE ORAL
Qty: 0 | Refills: 0 | Status: DISCONTINUED | OUTPATIENT
Start: 2019-04-16 | End: 2019-04-18

## 2019-04-16 RX ORDER — HEPARIN SODIUM 5000 [USP'U]/ML
2000 INJECTION INTRAVENOUS; SUBCUTANEOUS EVERY 6 HOURS
Qty: 0 | Refills: 0 | Status: DISCONTINUED | OUTPATIENT
Start: 2019-04-16 | End: 2019-04-17

## 2019-04-16 RX ORDER — CHOLECALCIFEROL (VITAMIN D3) 125 MCG
1 CAPSULE ORAL
Qty: 0 | Refills: 0 | COMMUNITY

## 2019-04-16 RX ORDER — ATORVASTATIN CALCIUM 80 MG/1
10 TABLET, FILM COATED ORAL AT BEDTIME
Qty: 0 | Refills: 0 | Status: DISCONTINUED | OUTPATIENT
Start: 2019-04-16 | End: 2019-04-18

## 2019-04-16 RX ORDER — ASPIRIN/CALCIUM CARB/MAGNESIUM 324 MG
1 TABLET ORAL
Qty: 0 | Refills: 0 | COMMUNITY

## 2019-04-16 RX ADMIN — ATORVASTATIN CALCIUM 10 MILLIGRAM(S): 80 TABLET, FILM COATED ORAL at 23:27

## 2019-04-16 RX ADMIN — SODIUM CHLORIDE 3 MILLILITER(S): 9 INJECTION INTRAMUSCULAR; INTRAVENOUS; SUBCUTANEOUS at 15:51

## 2019-04-16 RX ADMIN — HEPARIN SODIUM 1100 UNIT(S)/HR: 5000 INJECTION INTRAVENOUS; SUBCUTANEOUS at 16:36

## 2019-04-16 RX ADMIN — HEPARIN SODIUM 0 UNIT(S)/HR: 5000 INJECTION INTRAVENOUS; SUBCUTANEOUS at 23:23

## 2019-04-16 RX ADMIN — HEPARIN SODIUM 4500 UNIT(S): 5000 INJECTION INTRAVENOUS; SUBCUTANEOUS at 16:36

## 2019-04-16 NOTE — H&P ADULT - NSICDXFAMILYHX_GEN_ALL_CORE_FT
FAMILY HISTORY:  Sibling  Still living? Unknown  Family history of lung cancer, Age at diagnosis: Age Unknown

## 2019-04-16 NOTE — ED ADULT NURSE REASSESSMENT NOTE - NS ED NURSE REASSESS COMMENT FT1
report given to Barbra FANG 3N. pt denies pain. vss. heparin drip infusing at 11ml/hr. no s/s of bleeding noted. pending transport, will continue to monitor

## 2019-04-16 NOTE — ED PROVIDER NOTE - CARE PLAN
Principal Discharge DX:	Pulmonary emboli  Secondary Diagnosis:	Dyspnea on exertion  Secondary Diagnosis:	Heart failure, diastolic

## 2019-04-16 NOTE — ED PROVIDER NOTE - CLINICAL SUMMARY MEDICAL DECISION MAKING FREE TEXT BOX
89 yo WM, mult PMH incl. CAD, CHF, + cardiomems insertion 1/2019, inpt adm. 12/2018 re: SOB/ PNA, p/w wife s/p abnl. outpt CTA chest today, done re: couple weeks worsening SOW, no cp, SOB at rest, LE pain/swelling.  Plan: d/w Radiology office to have report faxed to ED for review, EKG, CXR, labs.  Monitor, observe, reassess.  IV heparin if report confirms PE.

## 2019-04-16 NOTE — H&P ADULT - HISTORY OF PRESENT ILLNESS
89 yo WM, h/o CAD on Plavix, CHF w/ preserved LVEF, s/p cardiomems insertion 1/2019 (complicated by transient hemoptysis), SVT s/p ablation, BIB by family due to abnormal outpatient CTA chest today.  Patient was hospitalized 12/2018 at  for ? PNA, + subsequent persistent cough of yellow sputum & SOW.  SOW has gradually worsened over past couple weeks, no SOB at rest.  Patient denies chest pain, N/V, LE pain/swelling. No hemoptysis today. Verbal report relayed from PCP office: + small PE RLL, small B/L pl. effusions. 89 yo WM, h/o CAD on Plavix, CHF w/ preserved LVEF, s/p cardiomems insertion 1/2019 (complicated by transient hemoptysis), SVT s/p ablation, BIB by family due to abnormal outpatient CTA chest today.  Patient was hospitalized 12/2018 at  for ? PNA, + subsequent persistent cough of yellow sputum & SOW.  SOW has gradually worsened over past couple weeks, no SOB at rest.  Patient denies chest pain, N/V, LE pain/swelling. No hemoptysis today. Verbal report relayed from PCP office: + small PE RLL, small B/L pl. effusions.      Family hx:  patient is unable to provide detail family history at this time.

## 2019-04-16 NOTE — H&P ADULT - NSHPPHYSICALEXAM_GEN_ALL_CORE
Vital Signs Last 24 Hrs  T(C): 36.8 (16 Apr 2019 18:21), Max: 36.8 (16 Apr 2019 18:21)  T(F): 98.2 (16 Apr 2019 18:21), Max: 98.2 (16 Apr 2019 18:21)  HR: 80 (16 Apr 2019 18:21) (73 - 80)  BP: 113/61 (16 Apr 2019 18:21) (113/61 - 117/63)  RR: 18 (16 Apr 2019 18:21) (18 - 18)  SpO2: 100% (16 Apr 2019 18:21) (97% - 100%)

## 2019-04-16 NOTE — ED PROVIDER NOTE - PMH
Heart failure, diastolic    HLD (hyperlipidemia)    [place] (hard of hearing)    SVT (supraventricular tachycardia)

## 2019-04-16 NOTE — ED PROVIDER NOTE - OBJECTIVE STATEMENT
89 yo WM, h/o CAD on Plavix, CHF w/ preserved LVEF, s/p cardiomems insertion 1/2019 (complicated by transient hemoptysis), SVT s/p ablation, + [place], BIB wife s/p abnormal outpt CTA chest today.  Pt hospitalized 12/2018 at  for ? PNA, + subsequent persistent cough of yellow sputum & SOW.  SOW has gradually worsened over past couple weeks, no SOB at rest.  Pt denies cp, F/C, N/V, LE pain/swelling, recent hemoptysis.  PCP eval last week --> outpt chest CTA today.  Verbal report relayed from PCP office: + small PE RLL, small B/L pl. effusions, + consolidation (? atelactasis vs. pulm. infarct).    PCP: Katharina.   Cardio: [place]

## 2019-04-16 NOTE — ED ADULT NURSE NOTE - PMH
Heart failure, diastolic    HLD (hyperlipidemia)    [place] (hard of hearing)    SVT (supraventricular tachycardia)

## 2019-04-16 NOTE — ED ADULT NURSE NOTE - CHIEF COMPLAINT QUOTE
patient sent by dr eller for ct scan that showed small rll pe, pleural effusions and consolidation.  Patient has had a cough since dec hospitalized for pneumonia and chf, had mems placed January 7 and has been followed by dr desai.  He has had persistent cough.  he is on 81mg of asa.

## 2019-04-16 NOTE — ED PROVIDER NOTE - CONSTITUTIONAL, MLM
normal... Elderly WM, awake, alert, oriented to person, place, time/situation, mildly ill-appearing but non-toxic.  No respiratory distress at rest, no sentence shortening.

## 2019-04-16 NOTE — H&P ADULT - NEUROLOGICAL DETAILS
deep reflexes intact/responds to verbal commands/alert and oriented x 3/cranial nerves intact/responds to pain/sensation intact/normal strength

## 2019-04-16 NOTE — H&P ADULT - NSICDXPASTMEDICALHX_GEN_ALL_CORE_FT
PAST MEDICAL HISTORY:  Heart failure, diastolic     HLD (hyperlipidemia)     [place] (hard of hearing)     SVT (supraventricular tachycardia)

## 2019-04-16 NOTE — H&P ADULT - ASSESSMENT
89 yo male presented with shortness of breath.    A/P:    1.  Shortness of breath  Cough  -most likely multifactorial  -found to have Pulmonary emboli and Pulmonary infarct- started on heparin drip  -also has Atelectasis - will keep on bedside spirometry   -follow Pulmonary consult    2.  HFpEF- s/s of volume overload   Elevated troponin  -will trend troponin  -follow in telemetry   -most likely demand ischemia  -follow cardiology consult     3.  DVT ppx: on heparin drip    4.  Code status: Full code 89 yo male presented with shortness of breath.    A/P:    1.  Shortness of breath  Cough  Pulmonary emboli  Pulmonary infarct  -most likely multifactorial  -found to have Pulmonary emboli and Pulmonary infarct- started on heparin drip  -also has Atelectasis - will keep on bedside spirometry   -follow Pulmonary consult    2.  HFpEF- s/s of volume overload   Elevated troponin  -will trend troponin  -follow in telemetry   -most likely demand ischemia  -follow cardiology consult     3.  DVT ppx: on heparin drip    4.  Code status: Full code 89 yo male presented with shortness of breath.    A/P:    1.  Shortness of breath  Cough  Pulmonary emboli  Pulmonary infarct  -most likely multifactorial  -found to have Pulmonary emboli and Pulmonary infarct- started on heparin drip  -also has Atelectasis - will keep on bedside spirometry   -follow Pulmonary consult    2.  HFpEF- s/s of volume overload   Elevated troponin  -will trend troponin  -follow in telemetry   -most likely demand ischemia  -follow cardiology consult     3.  Mild Hyperkalemia  -will follow level and manage accordingly  -as per lab may have slight hemolysis     4.  DVT ppx: on heparin drip    5.  Code status: Full code

## 2019-04-16 NOTE — ED ADULT TRIAGE NOTE - CHIEF COMPLAINT QUOTE
patient sent by dr eller for ct scan that showed small rll pe.  Patient has had a cough since dec hospitalized for pneumonia and chf, had mems placed January 7 and has been followed by dr desai.  He has had persistent cough.  he is on 81mg of asa patient sent by dr eller for ct scan that showed small rll pe, pleural effusions and consolidation.  Patient has had a cough since dec hospitalized for pneumonia and chf, had mems placed January 7 and has been followed by dr desai.  He has had persistent cough.  he is on 81mg of asa.

## 2019-04-16 NOTE — ED ADULT NURSE REASSESSMENT NOTE - NS ED NURSE REASSESS COMMENT FT1
Pt was weighed & weight was documented. Pt still WNL for heparin nonomgram Dr. Keller aware. Will continue to monitor

## 2019-04-16 NOTE — ED ADULT NURSE NOTE - NSIMPLEMENTINTERV_GEN_ALL_ED
Implemented All Universal Safety Interventions:  [place] to call system. Call bell, personal items and telephone within reach. Instruct patient to call for assistance. Room bathroom lighting operational. Non-slip footwear when patient is off stretcher. Physically safe environment: no spills, clutter or unnecessary equipment. Stretcher in lowest position, wheels locked, appropriate side rails in place.

## 2019-04-16 NOTE — ED ADULT NURSE REASSESSMENT NOTE - NS ED NURSE REASSESS COMMENT FT1
pt received from previous RN Leticia. AOX3, denies pain. vss. heparin gtt currently infusing at 11ml/hr. actual weight 60.8 kg, as per Dr. Keller, heparin drip ok to stay at current rate. family at bedside. no s/s of acute distress noted. pending bed placement. POC reviewed with pt and family, both verbalize understanding. call bell in reach, instructed to call for assistance. will continue to monitor pt received from previous RN Leticia. AOX3, denies pain. vss. heparin gtt currently infusing at 11ml/hr. actual weight 60.8 kg, as per Dr. Keller and heparin nomogram, heparin drip ok to stay at current rate. family at bedside. no s/s of acute distress noted. pending bed placement. POC reviewed with pt and family, both verbalize understanding. call bell in reach, instructed to call for assistance. will continue to monitor

## 2019-04-16 NOTE — ED PROVIDER NOTE - MUSCULOSKELETAL, MLM
Spine appears normal, range of motion is not limited, no muscle or joint tenderness.  OLSON x 4, no focal extremity swelling, tenderness.

## 2019-04-17 LAB
ANION GAP SERPL CALC-SCNC: 8 MMOL/L — SIGNIFICANT CHANGE UP (ref 5–17)
APTT BLD: 109.4 SEC — HIGH (ref 27.5–36.3)
APTT BLD: 127.5 SEC — CRITICAL HIGH (ref 27.5–36.3)
BASOPHILS # BLD AUTO: 0.08 K/UL — SIGNIFICANT CHANGE UP (ref 0–0.2)
BASOPHILS NFR BLD AUTO: 0.9 % — SIGNIFICANT CHANGE UP (ref 0–2)
BUN SERPL-MCNC: 24 MG/DL — HIGH (ref 7–23)
CALCIUM SERPL-MCNC: 9 MG/DL — SIGNIFICANT CHANGE UP (ref 8.5–10.1)
CHLORIDE SERPL-SCNC: 108 MMOL/L — SIGNIFICANT CHANGE UP (ref 96–108)
CO2 SERPL-SCNC: 25 MMOL/L — SIGNIFICANT CHANGE UP (ref 22–31)
CREAT SERPL-MCNC: 1.25 MG/DL — SIGNIFICANT CHANGE UP (ref 0.5–1.3)
EOSINOPHIL # BLD AUTO: 0.51 K/UL — HIGH (ref 0–0.5)
EOSINOPHIL NFR BLD AUTO: 5.8 % — SIGNIFICANT CHANGE UP (ref 0–6)
GLUCOSE SERPL-MCNC: 82 MG/DL — SIGNIFICANT CHANGE UP (ref 70–99)
HCT VFR BLD CALC: 37.7 % — LOW (ref 39–50)
HCT VFR BLD CALC: 40.7 % — SIGNIFICANT CHANGE UP (ref 39–50)
HGB BLD-MCNC: 12.5 G/DL — LOW (ref 13–17)
HGB BLD-MCNC: 13.2 G/DL — SIGNIFICANT CHANGE UP (ref 13–17)
IMM GRANULOCYTES NFR BLD AUTO: 0.5 % — SIGNIFICANT CHANGE UP (ref 0–1.5)
LYMPHOCYTES # BLD AUTO: 1.23 K/UL — SIGNIFICANT CHANGE UP (ref 1–3.3)
LYMPHOCYTES # BLD AUTO: 14.1 % — SIGNIFICANT CHANGE UP (ref 13–44)
MCHC RBC-ENTMCNC: 29.6 PG — SIGNIFICANT CHANGE UP (ref 27–34)
MCHC RBC-ENTMCNC: 30.1 PG — SIGNIFICANT CHANGE UP (ref 27–34)
MCHC RBC-ENTMCNC: 32.4 GM/DL — SIGNIFICANT CHANGE UP (ref 32–36)
MCHC RBC-ENTMCNC: 33.2 GM/DL — SIGNIFICANT CHANGE UP (ref 32–36)
MCV RBC AUTO: 90.8 FL — SIGNIFICANT CHANGE UP (ref 80–100)
MCV RBC AUTO: 91.3 FL — SIGNIFICANT CHANGE UP (ref 80–100)
MONOCYTES # BLD AUTO: 0.85 K/UL — SIGNIFICANT CHANGE UP (ref 0–0.9)
MONOCYTES NFR BLD AUTO: 9.7 % — SIGNIFICANT CHANGE UP (ref 2–14)
NEUTROPHILS # BLD AUTO: 6.01 K/UL — SIGNIFICANT CHANGE UP (ref 1.8–7.4)
NEUTROPHILS NFR BLD AUTO: 69 % — SIGNIFICANT CHANGE UP (ref 43–77)
NRBC # BLD: 0 /100 WBCS — SIGNIFICANT CHANGE UP (ref 0–0)
NRBC # BLD: 0 /100 WBCS — SIGNIFICANT CHANGE UP (ref 0–0)
PLATELET # BLD AUTO: 347 K/UL — SIGNIFICANT CHANGE UP (ref 150–400)
PLATELET # BLD AUTO: 371 K/UL — SIGNIFICANT CHANGE UP (ref 150–400)
POTASSIUM SERPL-MCNC: 4.3 MMOL/L — SIGNIFICANT CHANGE UP (ref 3.5–5.3)
POTASSIUM SERPL-SCNC: 4.3 MMOL/L — SIGNIFICANT CHANGE UP (ref 3.5–5.3)
RBC # BLD: 4.15 M/UL — LOW (ref 4.2–5.8)
RBC # BLD: 4.46 M/UL — SIGNIFICANT CHANGE UP (ref 4.2–5.8)
RBC # FLD: 14.6 % — HIGH (ref 10.3–14.5)
RBC # FLD: 14.6 % — HIGH (ref 10.3–14.5)
SODIUM SERPL-SCNC: 141 MMOL/L — SIGNIFICANT CHANGE UP (ref 135–145)
TROPONIN I SERPL-MCNC: 0.12 NG/ML — HIGH (ref 0.01–0.04)
WBC # BLD: 7.67 K/UL — SIGNIFICANT CHANGE UP (ref 3.8–10.5)
WBC # BLD: 8.72 K/UL — SIGNIFICANT CHANGE UP (ref 3.8–10.5)
WBC # FLD AUTO: 7.67 K/UL — SIGNIFICANT CHANGE UP (ref 3.8–10.5)
WBC # FLD AUTO: 8.72 K/UL — SIGNIFICANT CHANGE UP (ref 3.8–10.5)

## 2019-04-17 PROCEDURE — 93306 TTE W/DOPPLER COMPLETE: CPT | Mod: 26

## 2019-04-17 RX ORDER — RIVAROXABAN 15 MG-20MG
15 KIT ORAL
Qty: 0 | Refills: 0 | Status: DISCONTINUED | OUTPATIENT
Start: 2019-04-17 | End: 2019-04-18

## 2019-04-17 RX ORDER — HEPARIN SODIUM 5000 [USP'U]/ML
900 INJECTION INTRAVENOUS; SUBCUTANEOUS
Qty: 25000 | Refills: 0 | Status: DISCONTINUED | OUTPATIENT
Start: 2019-04-17 | End: 2019-04-17

## 2019-04-17 RX ORDER — HEPARIN SODIUM 5000 [USP'U]/ML
2500 INJECTION INTRAVENOUS; SUBCUTANEOUS EVERY 6 HOURS
Qty: 0 | Refills: 0 | Status: DISCONTINUED | OUTPATIENT
Start: 2019-04-17 | End: 2019-04-17

## 2019-04-17 RX ORDER — HEPARIN SODIUM 5000 [USP'U]/ML
5000 INJECTION INTRAVENOUS; SUBCUTANEOUS EVERY 6 HOURS
Qty: 0 | Refills: 0 | Status: DISCONTINUED | OUTPATIENT
Start: 2019-04-17 | End: 2019-04-17

## 2019-04-17 RX ORDER — FONDAPARINUX SODIUM 2.5 MG/.5ML
1 INJECTION, SOLUTION SUBCUTANEOUS
Qty: 42 | Refills: 0 | OUTPATIENT
Start: 2019-04-17

## 2019-04-17 RX ADMIN — PANTOPRAZOLE SODIUM 40 MILLIGRAM(S): 20 TABLET, DELAYED RELEASE ORAL at 06:23

## 2019-04-17 RX ADMIN — RIVAROXABAN 15 MILLIGRAM(S): KIT at 16:17

## 2019-04-17 RX ADMIN — Medication 1000 UNIT(S): at 12:58

## 2019-04-17 RX ADMIN — HEPARIN SODIUM 900 UNIT(S)/HR: 5000 INJECTION INTRAVENOUS; SUBCUTANEOUS at 00:46

## 2019-04-17 RX ADMIN — ATORVASTATIN CALCIUM 10 MILLIGRAM(S): 80 TABLET, FILM COATED ORAL at 21:29

## 2019-04-17 RX ADMIN — HEPARIN SODIUM 800 UNIT(S)/HR: 5000 INJECTION INTRAVENOUS; SUBCUTANEOUS at 07:56

## 2019-04-17 RX ADMIN — Medication 1 TABLET(S): at 12:58

## 2019-04-17 RX ADMIN — Medication 81 MILLIGRAM(S): at 12:58

## 2019-04-17 NOTE — DIETITIAN INITIAL EVALUATION ADULT. - PERTINENT LABORATORY DATA
04-17 Na141 mmol/L Glu 82 mg/dL K+ 4.3 mmol/L Cr  1.25 mg/dL BUN 24 mg/dL<H> Phos n/a   Alb n/a   PAB n/a

## 2019-04-17 NOTE — PROGRESS NOTE ADULT - SUBJECTIVE AND OBJECTIVE BOX
Patient is a 89y old  Male who presents with a chief complaint of complain of shortness of breath (17 Apr 2019 08:22)      SUBJECTIVE:   HPI:  89 yo WM, h/o CAD on Plavix, CHF w/ preserved LVEF, s/p cardiomems insertion 1/2019 (complicated by transient hemoptysis), SVT s/p ablation, BIB by family due to abnormal outpatient CTA chest today.  Patient was hospitalized 12/2018 at  for ? PNA, + subsequent persistent cough of yellow sputum & SOW.  SOW has gradually worsened over past couple weeks, no SOB at rest.  Patient denies chest pain, N/V, LE pain/swelling. No hemoptysis today. Verbal report relayed from PCP office: + small PE RLL, small B/L pl. effusions.      sub: O2S wnl. No ectopy. Spoke to wife about diagnosis at length at bedside    Family hx:  patient is unable to provide detail family history at this time. (16 Apr 2019 19:32)        REVIEW OF SYSTEMS:    CONSTITUTIONAL: No weakness, fevers or chills  EYES/ENT: No visual changes;  No vertigo or throat pain   NECK: No pain or stiffness  RESPIRATORY: No cough, wheezing, hemoptysis; No shortness of breath  CARDIOVASCULAR: No chest pain or palpitations  GASTROINTESTINAL: No abdominal or epigastric pain. No nausea, vomiting, or hematemesis; No diarrhea or constipation. No melena or hematochezia.  GENITOURINARY: No dysuria, frequency or hematuria  NEUROLOGICAL: No numbness or weakness  SKIN: No itching, burning, rashes, or lesions   All other review of systems is negative unless indicated above    ICU Vital Signs Last 24 Hrs  T(C): 36.4 (17 Apr 2019 10:45), Max: 36.8 (16 Apr 2019 18:21)  T(F): 97.6 (17 Apr 2019 10:45), Max: 98.2 (16 Apr 2019 18:21)  HR: 62 (17 Apr 2019 10:45) (62 - 84)  BP: 126/75 (17 Apr 2019 10:45) (99/59 - 127/63)  BP(mean): --  ABP: --  ABP(mean): --  RR: 18 (17 Apr 2019 10:45) (16 - 18)  SpO2: 96% (17 Apr 2019 10:45) (95% - 100%)    I&O's Summary      CAPILLARY BLOOD GLUCOSE          PHYSICAL EXAM:    Constitutional: NAD, awake and alert, well-developed  HEENT: PERR, EOMI, Normal Hearing, MMM  Neck: Soft and supple, No LAD, No JVD  Respiratory: crackles at left base  Cardiovascular: S1 and S2, regular rate and rhythm, no Murmurs, gallops or rubs  Gastrointestinal: Bowel Sounds present, soft, nontender, nondistended, no guarding, no rebound  Extremities: No peripheral edema  Vascular: 2+ peripheral pulses  Neurological: A/O x 3, no focal deficits  Musculoskeletal: 5/5 strength b/l upper and lower extremities  Skin: No rashes    MEDICATIONS:  MEDICATIONS  (STANDING):  aspirin enteric coated 81 milliGRAM(s) Oral daily  atorvastatin 10 milliGRAM(s) Oral at bedtime  cholecalciferol 1000 Unit(s) Oral daily  multivitamin 1 Tablet(s) Oral daily  pantoprazole    Tablet 40 milliGRAM(s) Oral before breakfast  rivaroxaban 15 milliGRAM(s) Oral two times a day      LABS: All Labs Reviewed:                        13.2   7.67  )-----------( 347      ( 17 Apr 2019 06:55 )             40.7     04-17    141  |  108  |  24<H>  ----------------------------<  82  4.3   |  25  |  1.25    Ca    9.0      17 Apr 2019 05:33    TPro  6.7  /  Alb  2.8<L>  /  TBili  0.4  /  DBili  x   /  AST  19  /  ALT  15  /  AlkPhos  85  04-16    PT/INR - ( 16 Apr 2019 15:34 )   PT: 11.9 sec;   INR: 1.07 ratio         PTT - ( 17 Apr 2019 06:55 )  PTT:127.5 sec  CARDIAC MARKERS ( 17 Apr 2019 05:33 )  0.124 ng/mL / x     / x     / x     / x      CARDIAC MARKERS ( 16 Apr 2019 21:59 )  0.153 ng/mL / x     / x     / x     / x      CARDIAC MARKERS ( 16 Apr 2019 18:13 )  0.109 ng/mL / x     / x     / x     / x      CARDIAC MARKERS ( 16 Apr 2019 15:34 )  0.112 ng/mL / x     / 42 U/L / x     / x              Blood Culture:     RADIOLOGY/EKG:    reviewed

## 2019-04-17 NOTE — PROGRESS NOTE ADULT - ASSESSMENT
89 yo male presented with shortness of breath.    A/P:    1.  Shortness of breath  Pulmonary emboli  Pulmonary infarct  -most likely multifactorial  -plan to switch to DOAC today. Hold heparin and dose doac 1 hour later  -echo to eval for rv strain  -also has Atelectasis - will keep on bedside spirometry   -follow Pulmonary consult    2.  HFpEF- not in acute CHF  -left pleural effusion old and stable  -D/W Pulm and will be monitored outpt and if worsens will consider thora  Elevated troponin  -no cardiac w/u planned inpt     3.  Mild Hyperkalemia  -reoslved      4.  DVT ppx: on heparin drip    5.  Code status: Full code

## 2019-04-17 NOTE — DIETITIAN INITIAL EVALUATION ADULT. - PERTINENT MEDS FT
MEDICATIONS  (STANDING):  aspirin enteric coated 81 milliGRAM(s) Oral daily  atorvastatin 10 milliGRAM(s) Oral at bedtime  cholecalciferol 1000 Unit(s) Oral daily  heparin  Infusion. 900 Unit(s)/Hr (9 mL/Hr) IV Continuous <Continuous>  multivitamin 1 Tablet(s) Oral daily  pantoprazole    Tablet 40 milliGRAM(s) Oral before breakfast    MEDICATIONS  (PRN):  acetaminophen   Tablet .. 650 milliGRAM(s) Oral every 6 hours PRN Temp greater or equal to 38C (100.4F), Mild Pain (1 - 3)  ALBUTerol/ipratropium for Nebulization 3 milliLiter(s) Nebulizer every 6 hours PRN Shortness of Breath and/or Wheezing  heparin  Injectable 5000 Unit(s) IV Push every 6 hours PRN For aPTT less than 40  heparin  Injectable 2500 Unit(s) IV Push every 6 hours PRN For aPTT between 40 - 57

## 2019-04-17 NOTE — PROGRESS NOTE ADULT - SUBJECTIVE AND OBJECTIVE BOX
HPI: CRYSTAL DUNLAP is a 89y old Male with PMH CAD on Plavix, CHF w/ preserved LVEF, s/p cardiomems insertion 2019 (complicated by transient hemoptysis), SVT s/p ablation, BIB by family due to abnormal outpatient CTA chest where he was noted to have pulmonary emboli. He had a recent hospitalization at  2018 for ? PNA, + subsequent persistent cough of yellow sputum & SOW.  His dyspnea on exertion has gradually worsened over past couple weeks, but he has had SOB at rest.    Wife at bedside giving history as patient is hard of hearing.    Past Medical History:   Heart failure, diastolic  SVT (supraventricular tachycardia)  HLD (hyperlipidemia)  [place] (hard of hearing)    Past Surgical History:   Status post ablation of ventricular arrhythmia    Family History:    Family history of lung cancer (Sibling)     Social History: Nonsmoker    Review of Systems:  All 10 systems reviewed in detailed and found to be negative with the exception of what has already been described above    Allergies:  No Known Allergies    Meds  MEDICATIONS  (STANDING):  aspirin enteric coated 81 milliGRAM(s) Oral daily  atorvastatin 10 milliGRAM(s) Oral at bedtime  cholecalciferol 1000 Unit(s) Oral daily  multivitamin 1 Tablet(s) Oral daily  pantoprazole    Tablet 40 milliGRAM(s) Oral before breakfast  rivaroxaban 15 milliGRAM(s) Oral two times a day    MEDICATIONS  (PRN):  acetaminophen   Tablet .. 650 milliGRAM(s) Oral every 6 hours PRN Temp greater or equal to 38C (100.4F), Mild Pain (1 - 3)  ALBUTerol/ipratropium for Nebulization 3 milliLiter(s) Nebulizer every 6 hours PRN Shortness of Breath and/or Wheezing    Physical Exam  T(C): 36.2 (19 @ 16:56), Max: 36.6 (19 @ 20:25)  HR: 87 (19 @ 16:56) (62 - 87)  BP: 124/73 (19 @ 16:56) (99/59 - 127/63)  RR: 18 (19 @ 16:56) (16 - 18)  SpO2: 97% (19 @ 16:56) (95% - 97%)  Gen: Alert, oriented, no distress  HEENT: Anicteric sclera, moist mucous membranes, no JVD, no lymphadenopathy, good dentition  Cardio: Regular rhythm and rate, normal S1S2, no murmurs  Resp: Crackles left lower lung field  GI: Nontender, nondistended, normoactive bowel sounds  Ext: No cyanosis, clubbing or edema  Neuro: Nonfocal    Labs:                        13.2   7.67  )-----------( 347      ( 2019 06:55 )             40.7     04-17    141  |  108  |  24<H>  ----------------------------<  82  4.3   |  25  |  1.25    Ca    9.0      2019 05:33    TPro  6.7  /  Alb  2.8<L>  /  TBili  0.4  /  DBili  x   /  AST  19  /  ALT  15  /  AlkPhos  85  04-16    PT/INR - ( 2019 15:34 )   PT: 11.9 sec;   INR: 1.07 ratio         PTT - ( 2019 13:39 )  PTT:109.4 sec  Urinalysis Basic - ( 2019 15:34 )    Color: Yellow / Appearance: Clear / S.005 / pH: x  Gluc: x / Ketone: Negative  / Bili: Negative / Urobili: Negative mg/dL   Blood: x / Protein: Negative mg/dL / Nitrite: Negative   Leuk Esterase: Negative / RBC: x / WBC x   Sq Epi: x / Non Sq Epi: x / Bacteria: x    < from: Xray Chest 1 View- PORTABLE-Urgent (19 @ 15:06) >  EXAM:  XR CHEST PORTABLE URGENT 1V                            PROCEDURE DATE:  2019          INTERPRETATION:  Exam Date: 2019 3:06 PM    Chest radiograph (one view)         CLINICAL INFORMATION:  SOB    TECHNIQUE:  Single frontal view ofthe chest was obtained.    COMPARISON: 2019    FINDINGS/  IMPRESSION:          Small to moderate left pleural effusion appears unchanged. Right lung is   clear. Cardiomediastinal silhouette is intact.    < end of copied text >

## 2019-04-17 NOTE — DIETITIAN INITIAL EVALUATION ADULT. - PHYSICAL APPEARANCE
underweight/NFPE significant for moderate muscle wasting in temples, clavicles and mild wasting in deltoids; moderate fat wasting in ribs and triceps and mild wasting in orbitals

## 2019-04-17 NOTE — DIETITIAN INITIAL EVALUATION ADULT. - NS AS NUTRI INTERV MEALS SNACK
Fluid - modified diet/Fat - modified diet/Mineral - modified diet/General/healthful diet/Composition of meals/snacks

## 2019-04-17 NOTE — PROGRESS NOTE ADULT - ASSESSMENT
89y old Male with PMH CAD on Plavix, CHF w/ preserved LVEF, s/p cardiomems insertion 1/2019 (complicated by transient hemoptysis), SVT s/p ablation, with PE, pulmonary infarct and pleural effusion  #PE, pulmonary infarct: On heparin. Switch to DOAC  -f/u Echo for RV strain  #Pleural effusion: Known since at least December  -Would recommend against thoracentesis for now given that he is on anticoagulation  #Atelectasis: Incentive spirometry    Will continue to follow

## 2019-04-17 NOTE — DIETITIAN INITIAL EVALUATION ADULT. - NS AS NUTRI DX NUTRIENT
Pt meets criteria for moderate energy-protein malnutrition in the context of chronic illness/Malnutrition Pt meets criteria for severe energy-protein malnutrition in the context of chronic illness/Malnutrition

## 2019-04-17 NOTE — CONSULT NOTE ADULT - ASSESSMENT
A/P: 89 yo WM, h/o CAD on Plavix, CHF w/ preserved LVEF, s/p cardiomems insertion 1/2019 (complicated by transient hemoptysis), SVT s/p ablation,  BIB by family due to abnormal outpatient CTA that revealed + small PE RLL, small B/L pl. effusions.      1. PE. Will continue IV heparin and overlap with coumadin for a goal INR of 2-3.   Follow INR daily.     2. CHF- diastolic HF. L pleural effusion noted. Hold off on diuresis until BP improves.   Possible thoracentesis? Suggest pulm eval.     3. HTN. Hold antihypertensives for now. SBP in 90s. Cont to follow.     4. CAD. No active CP. Cont outpt regimen. Avoid triple therapy in this elderly pt.   Cont asa for now.     5. DVT proph. Replete lytes as needed.

## 2019-04-17 NOTE — CONSULT NOTE ADULT - SUBJECTIVE AND OBJECTIVE BOX
Cardiology Consultation    HPI: 89 yo WM, h/o CAD on Plavix, CHF w/ preserved LVEF, s/p cardiomems insertion 2019 (complicated by transient hemoptysis), SVT s/p ablation, BIB by family due to abnormal outpatient CTA chest today.  Patient was hospitalized 2018 at  for ? PNA, + subsequent persistent cough of yellow sputum & SOW.  SOW has gradually worsened over past couple weeks, no SOB at rest.  Patient denies chest pain, N/V, LE pain/swelling. No hemoptysis today. Verbal report relayed from PCP office: + small PE RLL, small B/L pl. effusions.      . Chart reviewed, pt seen/examined on tele. SR on tele. No CP. [place]. SOW, no SOB at rest. Lying flat in bed.   No new complaints at this time.     PAST MEDICAL & SURGICAL HISTORY:  Heart failure, diastolic  SVT (supraventricular tachycardia)  HLD (hyperlipidemia)  [place] (hard of hearing)  Status post ablation of ventricular arrhythmia    Allergies  No Known Allergies    SOCIAL HISTORY: Denies tobacco, etoh abuse or illicit drug use    FAMILY HISTORY: Family history of lung cancer (Sibling)    MEDICATIONS  (STANDING):  aspirin enteric coated 81 milliGRAM(s) Oral daily  atorvastatin 10 milliGRAM(s) Oral at bedtime  cholecalciferol 1000 Unit(s) Oral daily  heparin  Infusion. 900 Unit(s)/Hr (9 mL/Hr) IV Continuous <Continuous>  multivitamin 1 Tablet(s) Oral daily  pantoprazole    Tablet 40 milliGRAM(s) Oral before breakfast    MEDICATIONS  (PRN):  acetaminophen   Tablet .. 650 milliGRAM(s) Oral every 6 hours PRN Temp greater or equal to 38C (100.4F), Mild Pain (1 - 3)  ALBUTerol/ipratropium for Nebulization 3 milliLiter(s) Nebulizer every 6 hours PRN Shortness of Breath and/or Wheezing  heparin  Injectable 5000 Unit(s) IV Push every 6 hours PRN For aPTT less than 40  heparin  Injectable 2500 Unit(s) IV Push every 6 hours PRN For aPTT between 40 - 57    Vital Signs Last 24 Hrs  T(C): 36.2 (2019 05:09), Max: 36.8 (2019 18:21)  T(F): 97.1 (2019 05:09), Max: 98.2 (2019 18:21)  HR: 76 (2019 05:09) (68 - 84)  BP: 99/59 (2019 05:09) (99/59 - 127/63)  BP(mean): --  RR: 17 (2019 05:09) (16 - 18)  SpO2: 96% (2019 05:09) (95% - 100%)    REVIEW OF SYSTEMS:    CONSTITUTIONAL:  As per HPI.  HEENT:  Eyes:  No diplopia or blurred vision. ENT:  No earache, sore throat or runny nose.  CARDIOVASCULAR:  No pressure, squeezing, strangling, tightness, heaviness or aching about the chest, neck, axilla or epigastrium.  RESPIRATORY:  No cough, shortness of breath, PND or orthopnea.  GASTROINTESTINAL:  No nausea, vomiting or diarrhea.  GENITOURINARY:  No dysuria, frequency or urgency.  MUSCULOSKELETAL:  As per HPI.  NEUROLOGIC:  No paresthesias, fasciculations, seizures or weakness.    PHYSICAL EXAMINATION:    GENERAL APPEARANCE:  Pt. is not currently dyspneic, in no distress. Pt. is alert, oriented, and pleasant.  HEENT:  Pupils are normal and react normally. No icterus. Mucous membranes well colored.  NECK:  Supple. No lymphadenopathy. Jugular venous pressure not elevated. Carotids equal.   HEART:   The cardiac impulse has a normal quality. There are no murmurs, rubs or gallops noted  CHEST: Decreased BS b/l. No wheeze.   ABDOMEN:  Soft and nontender.   EXTREMITIES:  There is no edema.     LABS:                        13.2   7.67  )-----------( 347      ( 2019 06:55 )             40.7     04-17    141  |  108  |  24<H>  ----------------------------<  82  4.3   |  25  |  1.25    Ca    9.0      2019 05:33    TPro  6.7  /  Alb  2.8<L>  /  TBili  0.4  /  DBili  x   /  AST  19  /  ALT  15  /  AlkPhos  85  04-16    LIVER FUNCTIONS - ( 2019 15:34 )  Alb: 2.8 g/dL / Pro: 6.7 gm/dL / ALK PHOS: 85 U/L / ALT: 15 U/L / AST: 19 U/L / GGT: x           PT/INR - ( 2019 15:34 )   PT: 11.9 sec;   INR: 1.07 ratio       PTT - ( 2019 06:55 )  PTT:127.5 sec  CARDIAC MARKERS ( 2019 05:33 )  0.124 ng/mL / x     / x     / x     / x      CARDIAC MARKERS ( 2019 21:59 )  0.153 ng/mL / x     / x     / x     / x      CARDIAC MARKERS ( 2019 18:13 )  0.109 ng/mL / x     / x     / x     / x      CARDIAC MARKERS ( 2019 15:34 )  0.112 ng/mL / x     / 42 U/L / x     / x        Urinalysis Basic - ( 2019 15:34 )    Color: Yellow / Appearance: Clear / S.005 / pH: x  Gluc: x / Ketone: Negative  / Bili: Negative / Urobili: Negative mg/dL   Blood: x / Protein: Negative mg/dL / Nitrite: Negative   Leuk Esterase: Negative / RBC: x / WBC x   Sq Epi: x / Non Sq Epi: x / Bacteria: x    EKG: < from: 12 Lead ECG (19 @ 20:07) >  Normal sinus rhythm  Septal infarct (cited on or before 2019)  Abnormal ECG    TELEMETRY: SR    CARDIAC TESTS: < from: Transthoracic Echocardiogram (18 @ 10:16) >   The left atrium is mildly dilated.   Mild to Moderate concentric left ventricular hypertrophy is present.   At least Moderate left ventricular apical hypertrophy is present.   Estimated left ventricular ejection fraction is 60-65 %.   Normal appearing right atrium.   Normal appearing right ventricle structure and function.   All visualized extra cardiac structures appears to be normal.   No evidence of pericardial effusion.   Pleural effusion - is present..   Fibrocalcific changes noted to the mitral valve leaflets with preserved   leaflet excursion.   Moderate (2+) mitral regurgitation is present.   Fibrocalcific changes noted to the Aortic valve leaflets with preserved   with minimally restricted leaflet excursion.   Trace aortic regurgitation is present.   Moderate (2+) tricuspid valve regurgitation is present.   Normal appearing pulmonic valve structure and function.    RADIOLOGY & ADDITIONAL STUDIES: < from: Xray Chest 1 View- PORTABLE-Urgent (19 @ 15:06) >  Small to moderate left pleural effusion appears unchanged. Right lung is   clear. Cardiomediastinal silhouette is intact.    ASSESSMENT & PLAN:

## 2019-04-17 NOTE — CHART NOTE - NSCHARTNOTEFT_GEN_A_CORE
Upon Nutritional Assessment by the Registered Dietitian your patient was determined to meet criteria / has evidence of the following diagnosis/diagnoses:          [ ]  Mild Protein Calorie Malnutrition        [ ]  Moderate Protein Calorie Malnutrition        [x] Severe Protein Calorie Malnutrition        [ ] Unspecified Protein Calorie Malnutrition        [ ] Underweight / BMI <19        [ ] Morbid Obesity / BMI > 40      Findings as based on:  •  Comprehensive nutrition assessment and consultation  •  Calorie counts (nutrient intake analysis)  •  Food acceptance and intake status from observations by staff  •  Follow up  •  Patient education  •  Intervention secondary to interdisciplinary rounds  •   concerns    Pt meets criteria for severe energy-protein malnutrition in the context of chronic illness.  NFPE significant for moderate muscle wasting in temples, clavicles and mild wasting in deltoids; moderate fat wasting in ribs and triceps and mild wasting in orbitals.  self reported poor PO intake <75% of ENN >1 mo.  11.7% wt loss in 6 mo.    Treatment:    The following diet has been recommended:  1) Rec continue with DASH/TLC. Encourage meal intake, Ensure Enlive between meals.   2) Monitor labs/electrolytes.   3) Daily wts.    PROVIDER Section:     By signing this assessment you are acknowledging and agree with the diagnosis/diagnoses assigned by the Registered Dietitian    Comments:

## 2019-04-17 NOTE — DIETITIAN INITIAL EVALUATION ADULT. - OTHER INFO
Pt consulted for CHF education and nutrition assessment. Pt is a 89yo male admitted 4/16 for Pulmonary Emboli. Last HH admission 1/19/19 for CHF exacerbation. PMH HLD, [place], SVT, CHF. Upon visit, pt very [place], appears underwt, NFPE significant for moderate muscle wasting in temples, clavicles and mild wasting in deltoids; moderate fat wasting in ribs and triceps and mild wasting in orbitals. Pt reports -145# 6 months ago, but has been losing wt since due to poor PO intake- says daily activity causes sob so prefers to lay down often. Reports fair appetite, usually consumes half of each meal serving- likely not meeting ENN. Reports constipation 2/2 poor intake of fruits and vegetables. Wife does the cooking and shopping at home, diet recall PTA reveals likely consumption of high sodium foods. Educated pt on benefits of a low sodium diet- provided edu. handout; pt receptive to education info. Pt meets criteria for moderate energy-protein malnutrition in the context of chronic illness r/t inadequate PO intake 2/2 sob, CHF AEB moderate muscle and fat wasting, self reported poor PO intake <75% of ENN >1 mo, 11.7% wt loss in 6 mo. Kris score 18, last BM 4/15, no edema noted. Recommendations: 1) Encourage meal intake, Ensure Enlive between meals. 2) Monitor labs/electrolytes. 3) Daily wts. Pt consulted for CHF education and nutrition assessment. Pt is a 89yo male admitted 4/16 for Pulmonary Emboli. Last HH admission 1/19/19 for CHF exacerbation. PMH HLD, [place], SVT, CHF. Upon visit, pt very [place], appears underwt, NFPE significant for moderate muscle wasting in temples, clavicles and mild wasting in deltoids; moderate fat wasting in ribs and triceps and mild wasting in orbitals. Pt reports -145# 6 months ago, but has been losing wt since due to poor PO intake- says daily activity causes sob so prefers to lay down often. Reports fair appetite, usually consumes half of each meal serving- likely not meeting ENN. Reports constipation 2/2 poor intake of fruits and vegetables. Wife does the cooking and shopping at home, diet recall PTA reveals likely consumption of high sodium foods. Educated pt on benefits of a low sodium diet- provided edu. handout; pt receptive to education info. Pt meets criteria for severe energy-protein malnutrition in the context of chronic illness r/t inadequate PO intake 2/2 sob, CHF AEB moderate muscle and fat wasting, self reported poor PO intake <75% of ENN >1 mo, 11.7% wt loss in 6 mo. Kris score 18, last BM 4/15, no edema noted. Recommendations: 1) Encourage meal intake, Ensure Enlive between meals. 2) Monitor labs/electrolytes. 3) Daily wts.

## 2019-04-18 ENCOUNTER — TRANSCRIPTION ENCOUNTER (OUTPATIENT)
Age: 84
End: 2019-04-18

## 2019-04-18 VITALS — OXYGEN SATURATION: 79 %

## 2019-04-18 LAB
HCT VFR BLD CALC: 40.2 % — SIGNIFICANT CHANGE UP (ref 39–50)
HGB BLD-MCNC: 12.9 G/DL — LOW (ref 13–17)
MCHC RBC-ENTMCNC: 29.6 PG — SIGNIFICANT CHANGE UP (ref 27–34)
MCHC RBC-ENTMCNC: 32.1 GM/DL — SIGNIFICANT CHANGE UP (ref 32–36)
MCV RBC AUTO: 92.2 FL — SIGNIFICANT CHANGE UP (ref 80–100)
NRBC # BLD: 0 /100 WBCS — SIGNIFICANT CHANGE UP (ref 0–0)
PLATELET # BLD AUTO: 375 K/UL — SIGNIFICANT CHANGE UP (ref 150–400)
RBC # BLD: 4.36 M/UL — SIGNIFICANT CHANGE UP (ref 4.2–5.8)
RBC # FLD: 14.6 % — HIGH (ref 10.3–14.5)
WBC # BLD: 6.79 K/UL — SIGNIFICANT CHANGE UP (ref 3.8–10.5)
WBC # FLD AUTO: 6.79 K/UL — SIGNIFICANT CHANGE UP (ref 3.8–10.5)

## 2019-04-18 RX ADMIN — Medication 1 TABLET(S): at 12:42

## 2019-04-18 RX ADMIN — RIVAROXABAN 15 MILLIGRAM(S): KIT at 05:39

## 2019-04-18 RX ADMIN — PANTOPRAZOLE SODIUM 40 MILLIGRAM(S): 20 TABLET, DELAYED RELEASE ORAL at 05:39

## 2019-04-18 RX ADMIN — Medication 81 MILLIGRAM(S): at 12:42

## 2019-04-18 RX ADMIN — Medication 1000 UNIT(S): at 12:42

## 2019-04-18 NOTE — DISCHARGE NOTE NURSING/CASE MANAGEMENT/SOCIAL WORK - NSDCPEPT PROEDHF_GEN_ALL_CORE
Monitor weight daily/Call primary care provider for follow up after discharge/Activities as tolerated/Low salt diet/Report signs and symptoms to primary care provider

## 2019-04-18 NOTE — DISCHARGE NOTE PROVIDER - CARE PROVIDER_API CALL
Ramesh Weinberg)  Internal Medicine; Pulmonary Disease; Sleep Medicine  [address] [place] [address]  Phone: [phone]  Fax: [phone]  Follow Up Time:     Tammi Posadas)  Internal Medicine  [address] [place] [address]  Phone: [phone]  Fax: [phone]  Follow Up Time:     Radha New)  Cardiovascular Disease; Internal Medicine  [address] [place] [address]  Phone: [phone]  Fax: [phone]  Follow Up Time:

## 2019-04-18 NOTE — PROGRESS NOTE ADULT - SUBJECTIVE AND OBJECTIVE BOX
Subjective:  Switched to rivaroxaban  Denies SOB    Review of Systems:  All 10 systems reviewed in detailed and found to be negative with the exception of what has already been described above    Allergies:  No Known Allergies    Meds  MEDICATIONS  (STANDING):  aspirin enteric coated 81 milliGRAM(s) Oral daily  atorvastatin 10 milliGRAM(s) Oral at bedtime  cholecalciferol 1000 Unit(s) Oral daily  multivitamin 1 Tablet(s) Oral daily  pantoprazole    Tablet 40 milliGRAM(s) Oral before breakfast  rivaroxaban 15 milliGRAM(s) Oral two times a day    MEDICATIONS  (PRN):  acetaminophen   Tablet .. 650 milliGRAM(s) Oral every 6 hours PRN Temp greater or equal to 38C (100.4F), Mild Pain (1 - 3)  ALBUTerol/ipratropium for Nebulization 3 milliLiter(s) Nebulizer every 6 hours PRN Shortness of Breath and/or Wheezing    Physical Exam  T(C): 36.3 (19 @ 11:10), Max: 36.6 (19 @ 04:41)  HR: 87 (19 @ 11:10) (73 - 87)  BP: 101/72 (19 @ 11:10) (101/72 - 124/73)  RR: 18 (19 @ 11:10) (17 - 18)  SpO2: 79% (19 @ 12:49) (79% - 100%)  Gen: Alert, oriented, no distress  HEENT: Anicteric sclera, moist mucous membranes, no JVD, no lymphadenopathy, good dentition  Cardio: Regular rhythm and rate, normal S1S2, no murmurs  Resp: Crackles left lower lung field  GI: Nontender, nondistended, normoactive bowel sounds  Ext: No cyanosis, clubbing or edema  Neuro: Nonfocal    Labs:                        12.9   6.79  )-----------( 375      ( 2019 05:47 )             40.2     -    141  |  108  |  24<H>  ----------------------------<  82  4.3   |  25  |  1.25    Ca    9.0      2019 05:33    TPro  6.7  /  Alb  2.8<L>  /  TBili  0.4  /  DBili  x   /  AST  19  /  ALT  15  /  AlkPhos  85  04-16    PT/INR - ( 2019 15:34 )   PT: 11.9 sec;   INR: 1.07 ratio         PTT - ( 2019 13:39 )  PTT:109.4 sec  Urinalysis Basic - ( 2019 15:34 )    Color: Yellow / Appearance: Clear / S.005 / pH: x  Gluc: x / Ketone: Negative  / Bili: Negative / Urobili: Negative mg/dL   Blood: x / Protein: Negative mg/dL / Nitrite: Negative   Leuk Esterase: Negative / RBC: x / WBC x   Sq Epi: x / Non Sq Epi: x / Bacteria: x    < from: Transthoracic Echocardiogram (18 @ 10:16) >   The left atrium is mildly dilated.   Mild to Moderate concentric left ventricular hypertrophy is present.   At least Moderate left ventricular apical hypertrophy is present.   Estimated left ventricular ejection fraction is 60-65 %.   Normal appearing right atrium.   Normal appearing right ventricle structure and function.   All visualized extra cardiac structures appears to be normal.   No evidence of pericardial effusion.   Pleural effusion - is present..   Fibrocalcific changes noted to the mitral valve leaflets with preserved   leaflet excursion.   Moderate (2+) mitral regurgitation is present.   Fibrocalcific changes noted to the Aortic valve leaflets with preserved   with minimally restricted leaflet excursion.   Trace aortic regurgitation is present.   Moderate (2+) tricuspid valve regurgitation is present.   Normal appearing pulmonic valve structure and function.      < from: Xray Chest 1 View- PORTABLE-Urgent (19 @ 15:06) >  EXAM:  XR CHEST PORTABLE URGENT 1V                            PROCEDURE DATE:  2019          INTERPRETATION:  Exam Date: 2019 3:06 PM    Chest radiograph (one view)         CLINICAL INFORMATION:  SOB    TECHNIQUE:  Single frontal view ofthe chest was obtained.    COMPARISON: 2019    FINDINGS/  IMPRESSION:          Small to moderate left pleural effusion appears unchanged. Right lung is   clear. Cardiomediastinal silhouette is intact.    < end of copied text >      CT Scan from [facility] (Reviewed with radiology)  Chronic atelectasis in Left upper lobes, near pleural  Subsegmental consolidation in the left lower lobe related to atelectatic parenchyma  Focal area of consolidation adjacent to pleural effusion  Right lower lobe pulmonary emboli  ? parenchymal infarcts

## 2019-04-18 NOTE — DISCHARGE NOTE NURSING/CASE MANAGEMENT/SOCIAL WORK - NSDCDPATPORTLINK_GEN_ALL_CORE
You can access the [facility], offered by [facility], by registering with the following website: [email or web address]

## 2019-04-18 NOTE — PROGRESS NOTE ADULT - ASSESSMENT
1. Pulmonary embolism- on anticoagulation with xarelto.  No SOB at rest this am.  discussed with wife this am and reviewed the outpt CT chest report.  Will discuss with pulmonary team when they return my page.     2. CHF- chronic compensated HFpEF- euvolemic on exam.  His cardioMEMS readings were noted to be optimal suggesting euvolemia.  No diuresis needed now.    3. HTN- at times BP on low normal end.  No antihypertensives for now.    4. CAD. No active CP. Cont outpt regimen. Avoid triple therapy in this elderly pt.   Cont asa for now.     5. DVT proph. Replete lytes as needed.     Other medical issues- Management per primary team.   Thank you for allowing me to participate in the care of this patient. Please feel free to contact me with any questions.

## 2019-04-18 NOTE — DISCHARGE NOTE NURSING/CASE MANAGEMENT/SOCIAL WORK - NSDCFUADDAPPT_GEN_ALL_CORE_FT
CHF Follow up made with doctor mackey for tuesday 4/23 11:15pm CHF Follow up made with doctor New for Tuesday, April 23rd at 11:15pm

## 2019-04-18 NOTE — DISCHARGE NOTE PROVIDER - HOSPITAL COURSE
· Subjective and Objective:     Patient is a 89y old  Male who presents with a chief complaint of complain of shortness of breath (17 Apr 2019 08:22)            SUBJECTIVE:     HPI:    89 yo WM, h/o CAD on Plavix, CHF w/ preserved LVEF, s/p cardiomems insertion 1/2019 (complicated by transient hemoptysis), SVT s/p ablation, BIB by family due to abnormal outpatient CTA chest today.  Patient was hospitalized 12/2018 at  for ? PNA, + subsequent persistent cough of yellow sputum & SOW.  SOW has gradually worsened over past couple weeks, no SOB at rest.  Patient denies chest pain, N/V, LE pain/swelling. No hemoptysis today. Verbal report relayed from PCP office: + small PE RLL, small B/L pl. effusions.      PHYSICAL EXAM:    ICU Vital Signs Last 24 Hrs    T(C): 36.3 (18 Apr 2019 11:10), Max: 36.6 (18 Apr 2019 04:41)    T(F): 97.3 (18 Apr 2019 11:10), Max: 97.9 (18 Apr 2019 04:41)    HR: 87 (18 Apr 2019 11:10) (73 - 87)    BP: 101/72 (18 Apr 2019 11:10) (101/72 - 124/73)    BP(mean): --    ABP: --    ABP(mean): --    RR: 18 (18 Apr 2019 11:10) (17 - 18)    SpO2: 100% (18 Apr 2019 11:10) (97% - 100%)        Constitutional: NAD, awake and alert, well-developed    HEENT: PERR, EOMI, Normal Hearing, MMM    Neck: Soft and supple, No LAD, No JVD    Respiratory: crackles at left base    Cardiovascular: S1 and S2, regular rate and rhythm, no Murmurs, gallops or rubs    Gastrointestinal: Bowel Sounds present, soft, nontender, nondistended, no guarding, no rebound    Extremities: No peripheral edema    Vascular: 2+ peripheral pulses    Neurological: A/O x 3, no focal deficits    Musculoskeletal: 5/5 strength b/l upper and lower extremities    Skin: No rashes· Assessment        all labs reviewed        89 yo male presented with shortness of breath.        A/P:        1.    Shortness of breath    Pulmonary emboli    Pulmonary infarct    -most likely multifactorial    -DOAC    - etiology behind cause of PE not identified. Will need Heme w/u as outpt to r/o hypercoaguable state    -echo to eval for rv strain : No signs of strain. D/W cards    -F/u with Dr Weinberg next week    -will check O2S prior to DC        2.    HFpEF- not in acute CHF    -left pleural effusion old and stable    -D/W Pulm and will be monitored outpt and if worsens will consider thora    Elevated troponin    -no cardiac w/u planned inpt         3.    Mild Hyperkalemia    -reoslvedCode status: Full code        dc time > 30 min. PCP will be notified.

## 2019-04-18 NOTE — PROGRESS NOTE ADULT - ASSESSMENT
89y old Male with PMH CAD on Plavix, CHF w/ preserved LVEF, s/p cardiomems insertion 1/2019 (complicated by transient hemoptysis), SVT s/p ablation, with PE, pulmonary infarct and pleural effusion  #PE, pulmonary infarct: On DOAC  #Pleural effusion: Known since at least December  -Would recommend against thoracentesis for now given that he is on anticoagulation  #Atelectasis: Incentive spirometry  #Findings on CT scan: Would likely need repeat CT scan in 3-6 months to assess for resolution    He can follow up with Dr. Kim at  [address] [place] [address]  Phone: [phone]    He will need to make a follow up appointment upon discharge.    Will continue to follow

## 2019-04-18 NOTE — PROGRESS NOTE ADULT - SUBJECTIVE AND OBJECTIVE BOX
Cardiology Consultation    HPI: 89 yo WM, h/o CAD on Plavix, CHF w/ preserved LVEF, s/p cardiomems insertion 1/2019 (complicated by transient hemoptysis), SVT s/p ablation, BIB by family due to abnormal outpatient CTA chest .  Patient was hospitalized 12/2018 at  for ? PNA, + subsequent persistent cough of yellow sputum & SOW.  SOW has gradually worsened over past couple weeks, no SOB at rest.    Ct scan of the chest showed RLL PE and small b/l pleural effusions.       Pt this am denies any CP or pressure or SOB at rest.  He did not ambulate this am.      PAST MEDICAL & SURGICAL HISTORY:  Heart failure, diastolic  SVT (supraventricular tachycardia)  HLD (hyperlipidemia)  [place] (hard of hearing)  Status post ablation of ventricular arrhythmia    Allergies  No Known Allergies    SOCIAL HISTORY: Denies tobacco, etoh abuse or illicit drug use    FAMILY HISTORY: Family history of lung cancer (Sibling)    MEDICATIONS  (STANDING):  aspirin enteric coated 81 milliGRAM(s) Oral daily  atorvastatin 10 milliGRAM(s) Oral at bedtime  cholecalciferol 1000 Unit(s) Oral daily  heparin  Infusion. 900 Unit(s)/Hr (9 mL/Hr) IV Continuous <Continuous>  multivitamin 1 Tablet(s) Oral daily  pantoprazole    Tablet 40 milliGRAM(s) Oral before breakfast    MEDICATIONS  (PRN):  acetaminophen   Tablet .. 650 milliGRAM(s) Oral every 6 hours PRN Temp greater or equal to 38C (100.4F), Mild Pain (1 - 3)  ALBUTerol/ipratropium for Nebulization 3 milliLiter(s) Nebulizer every 6 hours PRN Shortness of Breath and/or Wheezing  heparin  Injectable 5000 Unit(s) IV Push every 6 hours PRN For aPTT less than 40  heparin  Injectable 2500 Unit(s) IV Push every 6 hours PRN For aPTT between 40 - 57    ICU Vital Signs Last 24 Hrs  T(C): 36.6 (18 Apr 2019 04:41), Max: 36.6 (18 Apr 2019 04:41)  T(F): 97.9 (18 Apr 2019 04:41), Max: 97.9 (18 Apr 2019 04:41)  HR: 73 (18 Apr 2019 04:41) (62 - 87)  BP: 103/59 (18 Apr 2019 04:41) (103/59 - 126/75)  RR: 17 (18 Apr 2019 04:41) (17 - 18)  SpO2: 97% (18 Apr 2019 04:41) (96% - 97%)    REVIEW OF SYSTEMS:    CONSTITUTIONAL:  As per HPI.  HEENT: no neck pain   CARDIOVASCULAR:  no CP or SOB at rest  RESPIRATORY:  No cough, shortness of breath, PND or orthopnea.  GASTROINTESTINAL:  No nausea, vomiting or diarrhea.  GENITOURINARY:  No dysuria, frequency or urgency.  MUSCULOSKELETAL:  As per HPI.  NEUROLOGIC:  No paresthesias, fasciculations, seizures or weakness.    PHYSICAL EXAMINATION:    GENERAL APPEARANCE:  elderly, no acute distress  HEENT: no JVDNECK:  Supple. No lymphadenopathy. Jugular venous pressure not elevated. Carotids equal.   HEART:   The cardiac impulse has a normal quality. There are no murmurs, rubs or gallops noted  CHEST: Decreased BS b/l basal . No wheeze.   ABDOMEN:  Soft and nontender.   EXTREMITIES:  There is no edema.                           12.9   6.79  )-----------( 375      ( 18 Apr 2019 05:47 )             40.2     04-17    141  |  108  |  24<H>  ----------------------------<  82  4.3   |  25  |  1.25    Ca    9.0      17 Apr 2019 05:33    TPro  6.7  /  Alb  2.8<L>  /  TBili  0.4  /  DBili  x   /  AST  19  /  ALT  15  /  AlkPhos  85  04-16    CARDIAC MARKERS ( 17 Apr 2019 05:33 )  0.124 ng/mL / x     / x     / x     / x      CARDIAC MARKERS ( 16 Apr 2019 21:59 )  0.153 ng/mL / x     / x     / x     / x      CARDIAC MARKERS ( 16 Apr 2019 18:13 )  0.109 ng/mL / x     / x     / x     / x      CARDIAC MARKERS ( 16 Apr 2019 15:34 )  0.112 ng/mL / x     / 42 U/L / x     / x          LIVER FUNCTIONS - ( 16 Apr 2019 15:34 )  Alb: 2.8 g/dL / Pro: 6.7 gm/dL / ALK PHOS: 85 U/L / ALT: 15 U/L / AST: 19 U/L / GGT: x           PT/INR - ( 16 Apr 2019 15:34 )   PT: 11.9 sec;   INR: 1.07 ratio         PTT - ( 17 Apr 2019 13:39 )  PTT:109.4 sec        EKG: < from: 12 Lead ECG (04.16.19 @ 20:07) >  Normal sinus rhythm  Septal infarct (cited on or before 19-JAN-2019)  Abnormal ECG    TELEMETRY: SR    CARDIAC TESTS: < from: Transthoracic Echocardiogram (12.12.18 @ 10:16) >   The left atrium is mildly dilated.   Mild to Moderate concentric left ventricular hypertrophy is present.   At least Moderate left ventricular apical hypertrophy is present.   Estimated left ventricular ejection fraction is 60-65 %.   Normal appearing right atrium.   Normal appearing right ventricle structure and function.   All visualized extra cardiac structures appears to be normal.   No evidence of pericardial effusion.   Pleural effusion - is present..   Fibrocalcific changes noted to the mitral valve leaflets with preserved   leaflet excursion.   Moderate (2+) mitral regurgitation is present.   Fibrocalcific changes noted to the Aortic valve leaflets with preserved   with minimally restricted leaflet excursion.   Trace aortic regurgitation is present.   Moderate (2+) tricuspid valve regurgitation is present.   Normal appearing pulmonic valve structure and function.    RADIOLOGY & ADDITIONAL STUDIES: < from: Xray Chest 1 View- PORTABLE-Urgent (04.16.19 @ 15:06) >  Small to moderate left pleural effusion appears unchanged. Right lung is   clear. Cardiomediastinal silhouette is intact.

## 2019-04-18 NOTE — DISCHARGE NOTE PROVIDER - CARE PROVIDERS DIRECT ADDRESSES
,[email or web address]

## 2019-04-18 NOTE — DISCHARGE NOTE PROVIDER - PROVIDER TOKENS
PROVIDER:[TOKEN:[[id number]]],PROVIDER:[TOKEN:[[id number]]],PROVIDER:[TOKEN:[[id number]]]

## 2019-04-18 NOTE — DISCHARGE NOTE PROVIDER - NSDCCPCAREPLAN_GEN_ALL_CORE_FT
PRINCIPAL DISCHARGE DIAGNOSIS  Diagnosis: Pulmonary emboli  Assessment and Plan of Treatment:       SECONDARY DISCHARGE DIAGNOSES  Diagnosis: Heart failure, diastolic  Assessment and Plan of Treatment:     Diagnosis: Dyspnea on exertion  Assessment and Plan of Treatment:

## 2019-04-24 DIAGNOSIS — I24.8 OTHER FORMS OF ACUTE ISCHEMIC HEART DISEASE: ICD-10-CM

## 2019-04-24 DIAGNOSIS — E43 UNSPECIFIED SEVERE PROTEIN-CALORIE MALNUTRITION: ICD-10-CM

## 2019-04-24 DIAGNOSIS — I25.10 ATHEROSCLEROTIC HEART DISEASE OF NATIVE CORONARY ARTERY WITHOUT ANGINA PECTORIS: ICD-10-CM

## 2019-04-24 DIAGNOSIS — Z79.82 LONG TERM (CURRENT) USE OF ASPIRIN: ICD-10-CM

## 2019-04-24 DIAGNOSIS — I11.0 HYPERTENSIVE HEART DISEASE WITH HEART FAILURE: ICD-10-CM

## 2019-04-24 DIAGNOSIS — J98.11 ATELECTASIS: ICD-10-CM

## 2019-04-24 DIAGNOSIS — I26.99 OTHER PULMONARY EMBOLISM WITHOUT ACUTE COR PULMONALE: ICD-10-CM

## 2019-04-24 DIAGNOSIS — E87.5 HYPERKALEMIA: ICD-10-CM

## 2019-04-24 DIAGNOSIS — E78.5 HYPERLIPIDEMIA, UNSPECIFIED: ICD-10-CM

## 2019-04-24 DIAGNOSIS — I50.32 CHRONIC DIASTOLIC (CONGESTIVE) HEART FAILURE: ICD-10-CM

## 2019-07-16 ENCOUNTER — APPOINTMENT (OUTPATIENT)
Dept: INTERNAL MEDICINE | Facility: CLINIC | Age: 84
End: 2019-07-16

## 2021-03-10 ENCOUNTER — NON-APPOINTMENT (OUTPATIENT)
Age: 86
End: 2021-03-10

## 2021-03-24 ENCOUNTER — NON-APPOINTMENT (OUTPATIENT)
Age: 86
End: 2021-03-24

## 2021-03-25 ENCOUNTER — APPOINTMENT (OUTPATIENT)
Dept: INTERNAL MEDICINE | Facility: CLINIC | Age: 86
End: 2021-03-25
Payer: MEDICARE

## 2021-03-25 ENCOUNTER — NON-APPOINTMENT (OUTPATIENT)
Age: 86
End: 2021-03-25

## 2021-03-25 VITALS
SYSTOLIC BLOOD PRESSURE: 98 MMHG | BODY MASS INDEX: 24.27 KG/M2 | HEART RATE: 87 BPM | HEIGHT: 63 IN | TEMPERATURE: 96.3 F | OXYGEN SATURATION: 99 % | WEIGHT: 137 LBS | DIASTOLIC BLOOD PRESSURE: 68 MMHG

## 2021-03-25 DIAGNOSIS — Z86.79 PERSONAL HISTORY OF OTHER DISEASES OF THE CIRCULATORY SYSTEM: ICD-10-CM

## 2021-03-25 DIAGNOSIS — M54.5 LOW BACK PAIN: ICD-10-CM

## 2021-03-25 DIAGNOSIS — H91.90 UNSPECIFIED HEARING LOSS, UNSPECIFIED EAR: ICD-10-CM

## 2021-03-25 DIAGNOSIS — E78.00 PURE HYPERCHOLESTEROLEMIA, UNSPECIFIED: ICD-10-CM

## 2021-03-25 PROCEDURE — 36415 COLL VENOUS BLD VENIPUNCTURE: CPT

## 2021-03-25 PROCEDURE — 99214 OFFICE O/P EST MOD 30 MIN: CPT | Mod: 25

## 2021-03-25 PROCEDURE — 99072 ADDL SUPL MATRL&STAF TM PHE: CPT

## 2021-03-25 RX ORDER — MULTIVIT-MIN/FA/LYCOPEN/LUTEIN .4-300-25
TABLET ORAL
Refills: 0 | Status: ACTIVE | COMMUNITY

## 2021-03-25 RX ORDER — ASPIRIN 81 MG
81 TABLET, DELAYED RELEASE (ENTERIC COATED) ORAL
Refills: 0 | Status: ACTIVE | COMMUNITY

## 2021-03-25 NOTE — HISTORY OF PRESENT ILLNESS
[FreeTextEntry1] : Pt here   for follow up [de-identified] : Pt  ewife states pt cannot hear anymore at all. Has    been decling some confusion at night. He is very weak , feels like he is getting more sob, but has been sending [facility] reports to cardiology and has not been getting any feedback.\par Has been coughing   at night, no blood or  anything . Never c/o anything.   Appetite is poor,  difficult to maintain his weight.

## 2021-03-26 ENCOUNTER — EMERGENCY (EMERGENCY)
Facility: HOSPITAL | Age: 86
LOS: 0 days | Discharge: HOME | End: 2021-03-26
Attending: EMERGENCY MEDICINE
Payer: MEDICARE

## 2021-03-26 VITALS
OXYGEN SATURATION: 98 % | TEMPERATURE: 98 F | SYSTOLIC BLOOD PRESSURE: 122 MMHG | RESPIRATION RATE: 17 BRPM | HEART RATE: 87 BPM | DIASTOLIC BLOOD PRESSURE: 78 MMHG

## 2021-03-26 VITALS — WEIGHT: 138.01 LBS | HEIGHT: 64 IN

## 2021-03-26 DIAGNOSIS — K21.9 GASTRO-ESOPHAGEAL REFLUX DISEASE WITHOUT ESOPHAGITIS: ICD-10-CM

## 2021-03-26 DIAGNOSIS — I50.30 UNSPECIFIED DIASTOLIC (CONGESTIVE) HEART FAILURE: ICD-10-CM

## 2021-03-26 DIAGNOSIS — R79.9 ABNORMAL FINDING OF BLOOD CHEMISTRY, UNSPECIFIED: ICD-10-CM

## 2021-03-26 DIAGNOSIS — Z79.01 LONG TERM (CURRENT) USE OF ANTICOAGULANTS: ICD-10-CM

## 2021-03-26 DIAGNOSIS — I47.1 SUPRAVENTRICULAR TACHYCARDIA: ICD-10-CM

## 2021-03-26 DIAGNOSIS — E87.5 HYPERKALEMIA: ICD-10-CM

## 2021-03-26 DIAGNOSIS — Z79.82 LONG TERM (CURRENT) USE OF ASPIRIN: ICD-10-CM

## 2021-03-26 DIAGNOSIS — Z98.890 OTHER SPECIFIED POSTPROCEDURAL STATES: Chronic | ICD-10-CM

## 2021-03-26 LAB
ALBUMIN SERPL ELPH-MCNC: 2.8 G/DL — LOW (ref 3.3–5)
ALP SERPL-CCNC: 82 U/L — SIGNIFICANT CHANGE UP (ref 40–120)
ALT FLD-CCNC: 15 U/L — SIGNIFICANT CHANGE UP (ref 12–78)
ANION GAP SERPL CALC-SCNC: 5 MMOL/L — SIGNIFICANT CHANGE UP (ref 5–17)
AST SERPL-CCNC: 25 U/L — SIGNIFICANT CHANGE UP (ref 15–37)
BASOPHILS # BLD AUTO: 0.04 K/UL — SIGNIFICANT CHANGE UP (ref 0–0.2)
BASOPHILS NFR BLD AUTO: 0.4 % — SIGNIFICANT CHANGE UP (ref 0–2)
BILIRUB SERPL-MCNC: 0.6 MG/DL — SIGNIFICANT CHANGE UP (ref 0.2–1.2)
BUN SERPL-MCNC: 29 MG/DL — HIGH (ref 7–23)
CALCIUM SERPL-MCNC: 9.6 MG/DL — SIGNIFICANT CHANGE UP (ref 8.5–10.1)
CHLORIDE SERPL-SCNC: 109 MMOL/L — HIGH (ref 96–108)
CO2 SERPL-SCNC: 26 MMOL/L — SIGNIFICANT CHANGE UP (ref 22–31)
CREAT SERPL-MCNC: 1.66 MG/DL — HIGH (ref 0.5–1.3)
EOSINOPHIL # BLD AUTO: 0.15 K/UL — SIGNIFICANT CHANGE UP (ref 0–0.5)
EOSINOPHIL NFR BLD AUTO: 1.6 % — SIGNIFICANT CHANGE UP (ref 0–6)
GLUCOSE SERPL-MCNC: 86 MG/DL — SIGNIFICANT CHANGE UP (ref 70–99)
HCT VFR BLD CALC: 44.9 % — SIGNIFICANT CHANGE UP (ref 39–50)
HGB BLD-MCNC: 14.2 G/DL — SIGNIFICANT CHANGE UP (ref 13–17)
IMM GRANULOCYTES NFR BLD AUTO: 0.3 % — SIGNIFICANT CHANGE UP (ref 0–1.5)
LYMPHOCYTES # BLD AUTO: 0.95 K/UL — LOW (ref 1–3.3)
LYMPHOCYTES # BLD AUTO: 10.4 % — LOW (ref 13–44)
MCHC RBC-ENTMCNC: 30 PG — SIGNIFICANT CHANGE UP (ref 27–34)
MCHC RBC-ENTMCNC: 31.6 GM/DL — LOW (ref 32–36)
MCV RBC AUTO: 94.7 FL — SIGNIFICANT CHANGE UP (ref 80–100)
MONOCYTES # BLD AUTO: 0.83 K/UL — SIGNIFICANT CHANGE UP (ref 0–0.9)
MONOCYTES NFR BLD AUTO: 9.1 % — SIGNIFICANT CHANGE UP (ref 2–14)
NEUTROPHILS # BLD AUTO: 7.16 K/UL — SIGNIFICANT CHANGE UP (ref 1.8–7.4)
NEUTROPHILS NFR BLD AUTO: 78.2 % — HIGH (ref 43–77)
PLATELET # BLD AUTO: 328 K/UL — SIGNIFICANT CHANGE UP (ref 150–400)
POTASSIUM SERPL-MCNC: 5 MMOL/L — SIGNIFICANT CHANGE UP (ref 3.5–5.3)
POTASSIUM SERPL-SCNC: 5 MMOL/L — SIGNIFICANT CHANGE UP (ref 3.5–5.3)
PROT SERPL-MCNC: 6.8 GM/DL — SIGNIFICANT CHANGE UP (ref 6–8.3)
RBC # BLD: 4.74 M/UL — SIGNIFICANT CHANGE UP (ref 4.2–5.8)
RBC # FLD: 14.6 % — HIGH (ref 10.3–14.5)
SODIUM SERPL-SCNC: 140 MMOL/L — SIGNIFICANT CHANGE UP (ref 135–145)
WBC # BLD: 9.16 K/UL — SIGNIFICANT CHANGE UP (ref 3.8–10.5)
WBC # FLD AUTO: 9.16 K/UL — SIGNIFICANT CHANGE UP (ref 3.8–10.5)

## 2021-03-26 PROCEDURE — 93010 ELECTROCARDIOGRAM REPORT: CPT

## 2021-03-26 PROCEDURE — 80053 COMPREHEN METABOLIC PANEL: CPT

## 2021-03-26 PROCEDURE — 99283 EMERGENCY DEPT VISIT LOW MDM: CPT

## 2021-03-26 PROCEDURE — 85025 COMPLETE CBC W/AUTO DIFF WBC: CPT

## 2021-03-26 PROCEDURE — 93005 ELECTROCARDIOGRAM TRACING: CPT

## 2021-03-26 PROCEDURE — 99284 EMERGENCY DEPT VISIT MOD MDM: CPT

## 2021-03-26 PROCEDURE — 36415 COLL VENOUS BLD VENIPUNCTURE: CPT

## 2021-03-26 NOTE — ED PROVIDER NOTE - NSFOLLOWUPINSTRUCTIONS_ED_ALL_ED_FT
Potassium Test      Why am I having this test?    The potassium (K) test is done to determine how much potassium you have in your blood. Potassium is an important nutrient that helps your muscles and nerves function normally. It also helps maintain a stable acid–base balance in your bloodstream. Most of the body’s potassium is inside of cells, and only a very small amount is in the blood. Because the amount of potassium in the blood is so small, minor changes can have big effects.  Potassium blood levels are affected by many factors. This test may be done as part of routine blood work. It may also be done:  •To help diagnose the cause of a serious illness.      •To monitor treatment for conditions such as heart disease or heart failure.      •To check for problems with kidney function.      •To monitor the presence of certain hormones in your blood.      •To check for problems related to how much salt (sodium) leaves your body through urination.      •To monitor the effect of certain medicines you may be taking.        What is being tested?    This test measures the amount of potassium in your blood.      What kind of sample is taken?     A blood sample is required for this test. It is usually collected by inserting a needle into a blood vessel or by sticking a finger with a small needle.      Tell a health care provider about:    •All medicines you are taking, including vitamins, herbs, eye drops, creams, and over-the-counter medicines.      •Any medical conditions you have.        How are the results reported?  Your test results will be reported as a value that indicates the amount of potassium in your blood. Your health care provider will compare your results to normal ranges that were established after testing a large group of people (reference ranges). Reference ranges may vary among labs and hospitals. For this test, common reference ranges are:  •Adult or elderly: 3.5–5.0 mEq/L or 3.5–5.0 mmol/L (SI units).      •Child: 3.4–4.7 mEq/L.      •Infant: 4.1–5.3 mEq/L.      •: 3.9–5.9 mEq/L.        What do the results mean?  Results that are within the reference range are considered normal. Test results that are higher than normal can result from many things, including:  •Too much dietary intake of potassium-rich foods.      •Too much IV intake of potassium-rich solutions.      •Kidney failure.      •Jonnathan's disease.      •Decreased production of the aldosterone hormone by the kidneys (hypoaldosteronism).      •Crush injury to tissues.      •The breakdown of red blood cells in the spleen (hemolysis).      •Transfusion of broken-down (hemolyzed) blood cells.      •Infection.      •A condition in which your blood is too acidic (acidosis).      •Dehydration.      •Side effect from medicine.    Test results that are lower than normal can result from:  •Not enough dietary intake of potassium-rich foods.      •Not enough IV intake of potassium-rich solutions.      •Complications from a burn.      •Conditions that cause excessive or prolonged diarrhea or vomiting.      •Use of medicines that encourage extra fluid loss from your body through urination (diuretics).      •Too much production of aldosterone hormone by the kidneys (hyperaldosteronism).      •Cushing's syndrome.      •Kidney disease.      •Consuming too much licorice. When licorice is eaten in large amounts, it functions like the hormone aldosterone.      •A condition in which your blood is not acidic enough (alkalosis).      •Use of insulin.      •Taking in too much sugar (glucose) as a treatment for a low blood glucose level.      •Excess fluid in the abdomen (ascites).      •Narrowing or partial blockage of blood vessels to the kidney (renal artery stenosis).      •Cystic fibrosis.      •Complications from trauma.      •Surgery.      Talk with your health care provider about what your results mean.      Questions to ask your health care provider  Ask your health care provider, or the department that is doing the test:  •When will my results be ready?      •How will I get my results?      •What are my treatment options?      •What other tests do I need?      •What are my next steps?        Summary    •The potassium (K) test is done to determine how much potassium you have in your blood. Potassium is an important nutrient that helps your muscles and nerves function normally.      •This test may be done as part of routine blood work or to help diagnose or monitor certain conditions.      •A number of conditions can lead to a potassium level that is higher or lower than normal.      •Make sure you talk with your health care provider about what your results mean.      This information is not intended to replace advice given to you by your health care provider. Make sure you discuss any questions you have with your health care provider.      Document Revised: 2018 Document Reviewed: 2018    [employer] Patient Education ©  [employer]

## 2021-03-26 NOTE — ED ADULT NURSE NOTE - OBJECTIVE STATEMENT
89yo m pmh chf, GERD, p/w hyperkalemia on outpatient routine labs. k of 6.8. pt feels otherwise well w/o acute complaints. History difficult to obtain as patient is hard of hearing, wife at bedside. pt denies chest pain or shortness of breath. EKG done, IV lock placed pt on cardiac monitor.

## 2021-03-26 NOTE — ED PROVIDER NOTE - PATIENT PORTAL LINK FT
You can access the FollowMyHealth Patient Portal offered by [facility] by registering at the following website: [email or web address]. By joining [employer]’s FollowMyHealth portal, you will also be able to view your health information using other applications (apps) compatible with our system.

## 2021-03-26 NOTE — ED PROVIDER NOTE - OBJECTIVE STATEMENT
89yo m pmh chf, GERD, p/w hyperkalemia on outpatient routine labs. k of 6.8. pt feels otherwise well w/o acute complaints. History difficult to obtain as patient is hard of hearing.

## 2021-03-26 NOTE — ED PROVIDER NOTE - PMH
Heart failure, diastolic    HLD (hyperlipidemia)    [place] (hard of hearing)    SVT (supraventricular tachycardia)

## 2021-03-26 NOTE — ED ADULT TRIAGE NOTE - CHIEF COMPLAINT QUOTE
Pt p/w c/o Hyperkalemia 6.8, MD notified patient early this morning.  Hx of CHF, very hard of hearing hx obtained from Wife Jessica.

## 2021-03-26 NOTE — ED PROVIDER NOTE - PARTICIPANTS
Per wife, patient and wife would prefer not to hospitalize even if potassium elevated./Patient/Family

## 2021-03-31 ENCOUNTER — NON-APPOINTMENT (OUTPATIENT)
Age: 86
End: 2021-03-31

## 2021-03-31 LAB
ALBUMIN SERPL ELPH-MCNC: 3.6 G/DL
ALP BLD-CCNC: 90 U/L
ALT SERPL-CCNC: 12 U/L
ANION GAP SERPL CALC-SCNC: 12 MMOL/L
AST SERPL-CCNC: 24 U/L
BASOPHILS # BLD AUTO: 0.04 K/UL
BASOPHILS NFR BLD AUTO: 0.4 %
BILIRUB SERPL-MCNC: 0.5 MG/DL
BUN SERPL-MCNC: 28 MG/DL
CALCIUM SERPL-MCNC: 10.4 MG/DL
CHLORIDE SERPL-SCNC: 107 MMOL/L
CO2 SERPL-SCNC: 25 MMOL/L
CREAT SERPL-MCNC: 1.63 MG/DL
EOSINOPHIL # BLD AUTO: 0.05 K/UL
EOSINOPHIL NFR BLD AUTO: 0.5 %
GLUCOSE SERPL-MCNC: 93 MG/DL
HCT VFR BLD CALC: 48.7 %
HGB BLD-MCNC: 14.9 G/DL
IMM GRANULOCYTES NFR BLD AUTO: 0.4 %
LYMPHOCYTES # BLD AUTO: 0.68 K/UL
LYMPHOCYTES NFR BLD AUTO: 6.7 %
MAN DIFF?: NORMAL
MCHC RBC-ENTMCNC: 30.2 PG
MCHC RBC-ENTMCNC: 30.6 GM/DL
MCV RBC AUTO: 98.8 FL
MONOCYTES # BLD AUTO: 0.89 K/UL
MONOCYTES NFR BLD AUTO: 8.8 %
NEUTROPHILS # BLD AUTO: 8.46 K/UL
NEUTROPHILS NFR BLD AUTO: 83.2 %
PLATELET # BLD AUTO: 383 K/UL
POTASSIUM SERPL-SCNC: 6.8 MMOL/L
PROT SERPL-MCNC: 6.6 G/DL
RBC # BLD: 4.93 M/UL
RBC # FLD: 15 %
SODIUM SERPL-SCNC: 145 MMOL/L
WBC # FLD AUTO: 10.16 K/UL

## 2021-08-23 ENCOUNTER — LABORATORY RESULT (OUTPATIENT)
Age: 86
End: 2021-08-23

## 2021-08-23 ENCOUNTER — APPOINTMENT (OUTPATIENT)
Dept: INTERNAL MEDICINE | Facility: CLINIC | Age: 86
End: 2021-08-23
Payer: MEDICARE

## 2021-08-23 VITALS
HEIGHT: 63 IN | DIASTOLIC BLOOD PRESSURE: 66 MMHG | BODY MASS INDEX: 22.15 KG/M2 | WEIGHT: 125 LBS | SYSTOLIC BLOOD PRESSURE: 100 MMHG

## 2021-08-23 VITALS — HEART RATE: 69 BPM | OXYGEN SATURATION: 95 %

## 2021-08-23 DIAGNOSIS — J90 PLEURAL EFFUSION, NOT ELSEWHERE CLASSIFIED: ICD-10-CM

## 2021-08-23 DIAGNOSIS — Z00.00 ENCOUNTER FOR GENERAL ADULT MEDICAL EXAMINATION W/OUT ABNORMAL FINDINGS: ICD-10-CM

## 2021-08-23 DIAGNOSIS — I50.9 HEART FAILURE, UNSPECIFIED: ICD-10-CM

## 2021-08-23 PROCEDURE — 99214 OFFICE O/P EST MOD 30 MIN: CPT | Mod: 25

## 2021-08-23 PROCEDURE — 36415 COLL VENOUS BLD VENIPUNCTURE: CPT

## 2021-08-23 RX ORDER — MELOXICAM 7.5 MG/1
7.5 TABLET ORAL
Qty: 20 | Refills: 0 | Status: DISCONTINUED | COMMUNITY
Start: 2021-03-31 | End: 2021-08-23

## 2021-08-24 ENCOUNTER — NON-APPOINTMENT (OUTPATIENT)
Age: 86
End: 2021-08-24

## 2021-08-24 LAB
ALBUMIN SERPL ELPH-MCNC: 3.5 G/DL
ALBUMIN SERPL ELPH-MCNC: 3.5 G/DL
ALP BLD-CCNC: 82 U/L
ALT SERPL-CCNC: 21 U/L
ANION GAP SERPL CALC-SCNC: 12 MMOL/L
AST SERPL-CCNC: 31 U/L
BASOPHILS # BLD AUTO: 0.03 K/UL
BASOPHILS NFR BLD AUTO: 0.3 %
BILIRUB DIRECT SERPL-MCNC: 0.2 MG/DL
BILIRUB INDIRECT SERPL-MCNC: 0.2 MG/DL
BILIRUB SERPL-MCNC: 0.4 MG/DL
BUN SERPL-MCNC: 33 MG/DL
CALCIUM SERPL-MCNC: 9.5 MG/DL
CHLORIDE SERPL-SCNC: 103 MMOL/L
CHOLEST SERPL-MCNC: 185 MG/DL
CO2 SERPL-SCNC: 25 MMOL/L
CREAT SERPL-MCNC: 1.49 MG/DL
EOSINOPHIL # BLD AUTO: 0.05 K/UL
EOSINOPHIL NFR BLD AUTO: 0.5 %
FERRITIN SERPL-MCNC: 660 NG/ML
GLUCOSE SERPL-MCNC: 122 MG/DL
HCT VFR BLD CALC: 47.5 %
HDLC SERPL-MCNC: 50 MG/DL
HGB BLD-MCNC: 15.1 G/DL
IMM GRANULOCYTES NFR BLD AUTO: 0.6 %
IRON SERPL-MCNC: 57 UG/DL
LDLC SERPL CALC-MCNC: 116 MG/DL
LYMPHOCYTES # BLD AUTO: 0.53 K/UL
LYMPHOCYTES NFR BLD AUTO: 5.3 %
MAN DIFF?: NORMAL
MCHC RBC-ENTMCNC: 30.6 PG
MCHC RBC-ENTMCNC: 31.8 GM/DL
MCV RBC AUTO: 96.3 FL
MONOCYTES # BLD AUTO: 0.75 K/UL
MONOCYTES NFR BLD AUTO: 7.6 %
NEUTROPHILS # BLD AUTO: 8.5 K/UL
NEUTROPHILS NFR BLD AUTO: 85.7 %
NONHDLC SERPL-MCNC: 135 MG/DL
NT-PROBNP SERPL-MCNC: 7965 PG/ML
PHOSPHATE SERPL-MCNC: 2.6 MG/DL
PLATELET # BLD AUTO: 237 K/UL
POTASSIUM SERPL-SCNC: 5.2 MMOL/L
PROT SERPL-MCNC: 6.2 G/DL
RBC # BLD: 4.93 M/UL
RBC # FLD: 15.7 %
SODIUM SERPL-SCNC: 140 MMOL/L
T4 FREE SERPL-MCNC: 1.2 NG/DL
T4 SERPL-MCNC: 5.6 UG/DL
TRIGL SERPL-MCNC: 95 MG/DL
TSH SERPL-ACNC: 3.89 UIU/ML
WBC # FLD AUTO: 9.92 K/UL

## 2021-08-24 RX ORDER — POTASSIUM CHLORIDE 750 MG/1
10 TABLET, EXTENDED RELEASE ORAL DAILY
Qty: 10 | Refills: 0 | Status: ACTIVE | COMMUNITY
Start: 2021-08-24 | End: 1900-01-01

## 2021-08-24 RX ORDER — FUROSEMIDE 20 MG/1
20 TABLET ORAL
Qty: 20 | Refills: 0 | Status: ACTIVE | COMMUNITY
Start: 2021-08-24 | End: 1900-01-01

## 2021-08-29 LAB
25(OH)D3 SERPL-MCNC: 76.4 NG/ML
ALBUMIN SERPL ELPH-MCNC: 3.5 G/DL
ALP BLD-CCNC: 82 U/L
ALT SERPL-CCNC: 21 U/L
ANION GAP SERPL CALC-SCNC: 12 MMOL/L
AST SERPL-CCNC: 31 U/L
BILIRUB SERPL-MCNC: 0.4 MG/DL
BUN SERPL-MCNC: 33 MG/DL
CALCIUM SERPL-MCNC: 9.5 MG/DL
CHLORIDE SERPL-SCNC: 103 MMOL/L
CO2 SERPL-SCNC: 25 MMOL/L
CREAT SERPL-MCNC: 1.49 MG/DL
GLUCOSE SERPL-MCNC: 122 MG/DL
HCV AB SER QL: ABNORMAL
HCV S/CO RATIO: 4.41 S/CO
POTASSIUM SERPL-SCNC: 5.2 MMOL/L
PROT SERPL-MCNC: 6.2 G/DL
PSA SERPL-MCNC: 2.2 NG/ML
SODIUM SERPL-SCNC: 140 MMOL/L

## 2021-09-02 DIAGNOSIS — M62.81 MUSCLE WEAKNESS (GENERALIZED): ICD-10-CM

## 2021-09-21 ENCOUNTER — NON-APPOINTMENT (OUTPATIENT)
Age: 86
End: 2021-09-21

## 2021-09-23 ENCOUNTER — NON-APPOINTMENT (OUTPATIENT)
Age: 86
End: 2021-09-23

## 2021-10-04 NOTE — ED PROVIDER NOTE - NS ED MD DISPO ADMITTING SERVICE
Speech Language Pathology      NAME:  Danilo Garnica Sr.  :  [id number]  DATE: 10/4/2021  ROOM:  [address] [place] [address]-Refugio LEYVA CCC/SLP [id number]  Speech-Language Pathologist MED

## 2021-11-07 ENCOUNTER — NON-APPOINTMENT (OUTPATIENT)
Age: 86
End: 2021-11-07

## 2021-11-08 ENCOUNTER — NON-APPOINTMENT (OUTPATIENT)
Age: 86
End: 2021-11-08

## 2021-11-08 ENCOUNTER — EMERGENCY (EMERGENCY)
Facility: HOSPITAL | Age: 86
LOS: 1 days | End: 2021-11-08
Attending: EMERGENCY MEDICINE | Admitting: EMERGENCY MEDICINE
Payer: MEDICARE

## 2021-11-08 DIAGNOSIS — Z98.890 OTHER SPECIFIED POSTPROCEDURAL STATES: Chronic | ICD-10-CM

## 2021-11-08 PROCEDURE — 99291 CRITICAL CARE FIRST HOUR: CPT

## 2021-11-08 PROCEDURE — 99285 EMERGENCY DEPT VISIT HI MDM: CPT

## 2021-11-08 NOTE — ED PROVIDER NOTE - CONSTITUTIONAL, MLM
uncon unresponsive with et tube in place blood coming from tube and commercial tube gillis, pt has thumper on chest and pulses with thumper but asystole and no pulse without thumper running. no spontaneous breathing or pulses normal...

## 2021-11-08 NOTE — ED PROVIDER NOTE - CRITICAL CARE ATTENDING CONTRIBUTION TO CARE
89 yo male being evaluated for home hospice has endstage chf plerual effusions had sudden cardiac arrest after going to the bathrrom helped to floor by wife  who then called 911, ems reports pt had et tube io placement epix5 bicarg amii lido calcuim brief rosc but then lost pulses again  pt made dnr in er by wife code called  extensive discussion with wife and son

## 2021-11-08 NOTE — ED PROVIDER NOTE - PROGRESS NOTE DETAILS
wife at  bedside with son to identify pt pcp dr markie eller paged [phone] office aware will sign death certificate [place] medical examiner investigator releases Dayana

## 2021-11-08 NOTE — ED PROVIDER NOTE - CLINICAL SUMMARY MEDICAL DECISION MAKING FREE TEXT BOX
89 yo male being evaluated for home hospice has endstage chf plerual effusions had sudden cardiac arrest after going to the bathrrom helped to floor by wife  who then called 911, ems reports pt had et tube io placement epix5 bicarg amii lido calcuim brief rosc but then lost pulses again  pt made dnr in er by wife code called  extensive discussion with wife and son

## 2021-11-08 NOTE — ED PROVIDER NOTE - OBJECTIVE STATEMENT
pt arrived in cardiac arrest with cpr thumper in place running-  per ems: pt found in cardiac arrest- et placed, io placed  r tib epi x 5  bicarb 1 amp calcium 1 gr with pea to rosc  briefly then vbig to asystole amio x 300 and lidocaine 100 mg given pt transported by ems to er.  per wife: pt had just gotten off the toilet became unresponsive and wife called 911.  he has hx of chf and pleural effusions, was being made hospice and dnr but not all the documentation was in place, he slept in a chair in the living room because he had a broken orif of r hip and was too ill to have it repaired.  he was dependent on others for care.  never a smoker not a drinker no drugs pmd dr markie eller

## 2021-11-08 NOTE — ED ADULT NURSE NOTE - OBJECTIVE STATEMENT
Patient received to room 19 unresponsive, in vfib with EDGARDO applying CPR.  Per EMS, rosc was attained momentarily at the home, patient then went into vfib and has remained in vfib despite epi and amiodarone.  EMS states patient was awaiting hospice eval, and wife states she does not want hum resuscitated. Patient evaluated by Dr. Kinsey, who consulted with the team and pronounced the patient at 1020 AM.  Patient cleaned and prepared for family to view. Patient received to room 19 unresponsive, in vfib with EDGARDO applying CPR.  Per EMS, rosc was attained momentarily at the home, patient then went into vfib and has remained in vfib despite epi and amiodarone by EMS in the field.  Patient has patent I.O. right tibia, Normal Saline infusing.  EMS states patient was awaiting hospice eval, and wife states she does not want him resuscitated. Patient evaluated by Dr. Kinsey, who consulted with the team and pronounced the patient at 1020 AM.  Patient cleaned and prepared for family to view.

## 2021-11-08 NOTE — ED PROVIDER NOTE - PHYSICAL EXAMINATION
cardiac arrest on arrival brief intervention with cpr and bvm before wife announced ot admitting she wished for dnr and cessation of care.  pt was hospice bound and this wish was granted based on clinical findings below. the code was called after consultation with the cardiac arrest team where there were no other alternatives nor any hesitation to the pronouncement.  wife at bedside

## 2021-11-09 ENCOUNTER — NON-APPOINTMENT (OUTPATIENT)
Age: 86
End: 2021-11-09

## 2022-01-01 NOTE — ED PROVIDER NOTE - CHIEF COMPLAINT
The patient is a 89y Male complaining of see chief complaint quote.
Yes-Patient/Caregiver accepts free interpretation services...

## 2022-02-21 NOTE — ED ADULT NURSE NOTE - TEMPLATE
Ongoing SW/CM Assessment/Plan of Care Note     See SW/CM flowsheets for goals and other objective data.    Patient/Family discharge goal (s):  Goal #1: Communication facilitated  Goal #2: Psychosocial needs assessed  Goal #3: Adjustment/coping issues addressed    PT Recommendation:  Recommendation for Discharge: PT WI: Home       OT Recommendation:  Recommendations for Discharge: OT WI: Home       SLP Recommendation:  Recommendations for Discharge: SLP: SNF    Disposition:       Progress note:   SW following for dc planning.  No accepting SNF facilities identified.  Consulted with attending MD.  MD recommends not deactivating POA.  Pt now on IV Lasix, no medically ready for discharge.  SW has discussed with mother about potential of discharge to home.  SW to continue to follow.           General

## 2022-10-03 NOTE — ED ADULT NURSE NOTE - ALCOHOL PRE SCREEN (AUDIT - C)
Detail Level: Zone Photo Preface (Leave Blank If You Do Not Want): Photographs were obtained today Statement Selected

## 2022-12-28 NOTE — ED ADULT NURSE NOTE - NS ED NURSE REPORT GIVEN TO FT
Detail Level: Detailed
Products Recommended: Cerave
General Sunscreen Counseling: I recommended a broad spectrum sunscreen with a SPF of 30 or higher. I explained that SPF 30 sunscreens block approximately 97 percent of the sun's harmful rays. Sunscreens should be applied at least 15 minutes prior to expected sun exposure and then every 2 hours after that as long as sun exposure continues. If swimming or exercising sunscreen should be reapplied every 45 minutes to an hour after getting wet or sweating. One ounce, or the equivalent of a shot glass full of sunscreen, is adequate to protect the skin not covered by a bathing suit. I also recommended a lip balm with a sunscreen as well. Sun protective clothing can be used in lieu of sunscreen but must be worn the entire time you are exposed to the sun's rays.
Claudine li

## 2023-07-28 NOTE — ASU PREOP CHECKLIST - BMI (KG/M2)
Pt was contacted advise to f/u with staff being pt is an inpatient at the hospital.Pt understand no question noted at this time.   
24.9

## 2023-11-02 NOTE — ED ADULT NURSE NOTE - NS ED NURSE LEVEL OF CONSCIOUSNESS AFFECT
Vaccine Information Sheet, \"Influenza - Inactivated\"  given to Kamran Gomez, or parent/legal guardian of  Kamran Gomez and verbalized understanding. Patient responses:    Have you ever had a reaction to a flu vaccine? No  Do you have an allergy to eggs, neomycin or polymixin? No  Do you have an allergy to Thimerosal, contact lens solution, or Merthiolate? No  Have you ever had Guillian Cobalt Syndrome? No  Do you have any current illness? No  Do you have a temperature above 100 degrees? No  Are you pregnant? No  If pregnant, permission obtained from physician? No  Do you have an active neurological disorder? No      Flu vaccine given per order. Please see immunization tab. After obtaining consent, and per orders of Dr. Kenya Nieves, injection of Flu vaccine given in Right deltoid by Triny Childs LPN. Patient instructed to remain in clinic for 20 minutes afterwards, and to report any adverse reaction to me immediately. Patient tolerated well.
breath. Chronic diastolic CHF - systolic LV dysfunction is resolved on   meds      Medications    Current Outpatient Medications:     carvedilol (COREG) 25 MG tablet, TAKE 1 TABLET BY MOUTH TWICE A DAY, Disp: 60 tablet, Rfl: 3    docusate sodium (COLACE) 100 MG capsule, TAKE 1 CAPSULE BY MOUTH DAILY AS NEEDED, Disp: 28 capsule, Rfl: 3    aspirin (ASPIRIN LOW DOSE) 81 MG chewable tablet, CHEW 1 TABLET BY MOUTH DAILY, Disp: 28 tablet, Rfl: 3    potassium chloride (KLOR-CON M) 20 MEQ extended release tablet, TAKE 1 TABLET BY MOUTH DAILY, Disp: 28 tablet, Rfl: 3    atorvastatin (LIPITOR) 20 MG tablet, TAKE 1 TABLET BY MOUTH DAILY, Disp: 30 tablet, Rfl: 5    Misc. Devices (DIGITAL GLASS SCALE) MISC, Use for daily wt monitoring, Disp: 1 each, Rfl: 0    hydroCHLOROthiazide (HYDRODIURIL) 25 MG tablet, TAKE 1 TABLET BY MOUTH DAILY, Disp: 90 tablet, Rfl: 1    albuterol sulfate HFA (PROVENTIL HFA) 108 (90 Base) MCG/ACT inhaler, Inhale 2 puffs into the lungs every 6 hours as needed for Wheezing or Shortness of Breath, Disp: 1 each, Rfl: 2    tiotropium (SPIRIVA HANDIHALER) 18 MCG inhalation capsule, Inhale 1 capsule into the lungs daily, Disp: 90 capsule, Rfl: 2    losartan (COZAAR) 100 MG tablet, Take 1 tablet by mouth daily, Disp: , Rfl:     divalproex (DEPAKOTE ER) 500 MG ER tablet, Take 3 tablets by mouth nightly, Disp: , Rfl:     paliperidone palmitate (INVEGA SUSTENNA) 156 MG/ML SUSP IM injection, Inject 156 mg into the muscle every 30 days, Disp: , Rfl:     meloxicam (MOBIC) 15 MG tablet, , Disp: , Rfl:     tamsulosin (FLOMAX) 0.4 MG capsule, Take 1 capsule by mouth daily (Patient not taking: Reported on 11/2/2023), Disp: , Rfl:     allopurinol (ZYLOPRIM) 300 MG tablet, Take 1 tablet by mouth daily (Patient not taking: Reported on 11/2/2023), Disp: , Rfl:     The patient has No Known Allergies.     Past Medical History  Vivi Stern  has a past medical history of CAD (coronary artery disease), Cardiomyopathy ([address]), Chest
Calm